# Patient Record
Sex: MALE | Race: WHITE | NOT HISPANIC OR LATINO | Employment: FULL TIME | URBAN - METROPOLITAN AREA
[De-identification: names, ages, dates, MRNs, and addresses within clinical notes are randomized per-mention and may not be internally consistent; named-entity substitution may affect disease eponyms.]

---

## 2019-09-19 ENCOUNTER — OFFICE VISIT (OUTPATIENT)
Dept: FAMILY MEDICINE CLINIC | Facility: CLINIC | Age: 44
End: 2019-09-19
Payer: COMMERCIAL

## 2019-09-19 VITALS
WEIGHT: 173.8 LBS | TEMPERATURE: 98.5 F | DIASTOLIC BLOOD PRESSURE: 92 MMHG | OXYGEN SATURATION: 100 % | HEIGHT: 69 IN | RESPIRATION RATE: 18 BRPM | BODY MASS INDEX: 25.74 KG/M2 | SYSTOLIC BLOOD PRESSURE: 150 MMHG | HEART RATE: 96 BPM

## 2019-09-19 DIAGNOSIS — E66.3 OVERWEIGHT (BMI 25.0-29.9): ICD-10-CM

## 2019-09-19 DIAGNOSIS — I10 HTN (HYPERTENSION), BENIGN: Primary | ICD-10-CM

## 2019-09-19 DIAGNOSIS — J06.9 BACTERIAL URI: ICD-10-CM

## 2019-09-19 DIAGNOSIS — B96.89 BACTERIAL URI: ICD-10-CM

## 2019-09-19 PROCEDURE — 99203 OFFICE O/P NEW LOW 30 MIN: CPT | Performed by: NURSE PRACTITIONER

## 2019-09-19 RX ORDER — FEXOFENADINE HCL 180 MG/1
TABLET ORAL
COMMUNITY
Start: 2017-03-30

## 2019-09-19 RX ORDER — BENZONATATE 200 MG/1
200 CAPSULE ORAL 3 TIMES DAILY PRN
COMMUNITY
Start: 2019-09-18 | End: 2019-09-25

## 2019-09-19 RX ORDER — LISINOPRIL 10 MG/1
10 TABLET ORAL DAILY
Qty: 90 TABLET | Refills: 0 | Status: SHIPPED | OUTPATIENT
Start: 2019-09-19 | End: 2019-12-11 | Stop reason: SDUPTHER

## 2019-09-19 RX ORDER — AZITHROMYCIN 250 MG/1
TABLET, FILM COATED ORAL
Qty: 6 TABLET | Refills: 0 | Status: SHIPPED | OUTPATIENT
Start: 2019-09-19 | End: 2019-09-24

## 2019-09-19 RX ORDER — EPINEPHRINE 0.3 MG/.3ML
0.3 INJECTION SUBCUTANEOUS
COMMUNITY
Start: 2019-04-22

## 2019-09-19 RX ORDER — MONTELUKAST SODIUM 10 MG/1
10 TABLET ORAL
COMMUNITY
Start: 2019-08-22 | End: 2019-10-31 | Stop reason: SDUPTHER

## 2019-09-19 NOTE — PATIENT INSTRUCTIONS
Start Lisinopril 10mg daily for 1 week- if blood pressure remains over 140/90, increase to 20mg daily x 1 week, if blood pressure remains over 140/90, increase to 30mg daily  Return for medcheck and blood pressure check in 3 weeks or sooner if needed  Discussed viral vs bacterial infection  Zithromax if symptoms worsen as discussed  Salt water nasal rinses for mucus  Call or return for problems/concerns/questions

## 2019-09-19 NOTE — PROGRESS NOTES
[facility]        NAME: Jun Weber is a 37 y o  male  : 1975    MRN: [id number]  DATE: 2019  TIME: 2:37 PM    Assessment and Plan   HTN (hypertension), benign [I10]  1  HTN (hypertension), benign  lisinopril (ZESTRIL) 10 mg tablet   2  Bacterial URI  azithromycin (ZITHROMAX) 250 mg tablet   3  Overweight (BMI 25 0-29  9)           Patient Instructions     Patient Instructions   Start Lisinopril 10mg daily for 1 week- if blood pressure remains over 140/90, increase to 20mg daily x 1 week, if blood pressure remains over 140/90, increase to 30mg daily  Return for medcheck and blood pressure check in 3 weeks or sooner if needed  Discussed viral vs bacterial infection  Zithromax if symptoms worsen as discussed  Salt water nasal rinses for mucus  Call or return for problems/concerns/questions          Chief Complaint     Chief Complaint   Patient presents with    Hypertension    URI         History of Present Illness       Here for BP recheck- high at urgent care yesterday- thinks his BP has been elevated "for a while"- as had headaches on/off "for a while"  Also c/o URI- got Tessalon perles yesterday- believes the symptoms are worsening      Review of Systems   Review of Systems   Constitutional: Negative for activity change, chills, fatigue and fever  HENT: Positive for congestion, ear pain, postnasal drip, rhinorrhea, sinus pressure and sore throat  Eyes: Negative for pain, discharge and redness  Respiratory: Positive for cough  Negative for wheezing  Cardiovascular: Negative for chest pain  Gastrointestinal: Negative for diarrhea, nausea and vomiting  Musculoskeletal: Negative for myalgias  Skin: Negative for rash  Neurological: Positive for headaches  Negative for dizziness           Current Medications       Current Outpatient Medications:     benzonatate (TESSALON) 200 MG capsule, Take 200 mg by mouth Three times daily as needed, Disp: , Rfl:   EPINEPHrine (EPIPEN) 0 3 mg/0 3 mL SOAJ, Inject 0 3 mg into a muscle, Disp: , Rfl:     fexofenadine (ALLEGRA) 180 MG tablet, TAKE 1 TABLET (180 MG TOTAL) BY MOUTH DAILY  , Disp: , Rfl:     montelukast (SINGULAIR) 10 mg tablet, Take 10 mg by mouth, Disp: , Rfl:     azithromycin (ZITHROMAX) 250 mg tablet, 2 tablets day 1, then 1 tablet days 2-5, Disp: 6 tablet, Rfl: 0    lisinopril (ZESTRIL) 10 mg tablet, Take 1 tablet (10 mg total) by mouth daily, Disp: 90 tablet, Rfl: 0    Current Allergies     Allergies as of 09/19/2019 - never reviewed   Allergen Reaction Noted    Pollen extract  06/22/2017            The following portions of the patient's history were reviewed and updated as appropriate: allergies, current medications, past family history, past medical history, past social history, past surgical history and problem list      Past Medical History:   Diagnosis Date    Allergic        Past Surgical History:   Procedure Laterality Date    NOSE SURGERY      WISDOM TOOTH EXTRACTION         Family History   Problem Relation Age of Onset    Depression Mother     Depression Father          Medications have been verified  Objective   /92   Pulse 96   Temp 98 5 °F (36 9 °C) (Tympanic)   Resp 18   Ht 5' 9" (1 753 m)   Wt 78 8 kg (173 lb 12 8 oz)   SpO2 100%   BMI 25 67 kg/m²        Physical Exam     Physical Exam   Constitutional: He is oriented to person, place, and time  He appears well-developed and well-nourished  No distress  HENT:   Head: Normocephalic and atraumatic  Right Ear: Tympanic membrane, external ear and ear canal normal    Left Ear: Tympanic membrane, external ear and ear canal normal    Nose: Nose normal    Mouth/Throat: Uvula is midline, oropharynx is clear and moist and mucous membranes are normal  No oropharyngeal exudate  Eyes: Pupils are equal, round, and reactive to light  Conjunctivae and EOM are normal  Right eye exhibits no discharge   Left eye exhibits no discharge  Neck: Normal range of motion  Neck supple  No tracheal deviation present  No thyromegaly present  Cardiovascular: Normal rate, regular rhythm and normal heart sounds  No murmur heard  Pulmonary/Chest: Effort normal and breath sounds normal  No respiratory distress  He has no wheezes  Musculoskeletal: Normal range of motion  Lymphadenopathy:     He has no cervical adenopathy  Neurological: He is alert and oriented to person, place, and time  Skin: Skin is warm and dry  He is not diaphoretic  Psychiatric: He has a normal mood and affect  His behavior is normal  Judgment and thought content normal    Nursing note and vitals reviewed  BMI Counseling: Body mass index is 25 67 kg/m²  The BMI is above normal  Nutrition recommendations include reducing portion sizes and moderation in carbohydrate intake      PHQ-9 Depression Screening    PHQ-9:    Frequency of the following problems over the past two weeks:       Little interest or pleasure in doing things:  0 - not at all  Feeling down, depressed, or hopeless:  0 - not at all  PHQ-2 Score:  0

## 2019-10-14 ENCOUNTER — OFFICE VISIT (OUTPATIENT)
Dept: FAMILY MEDICINE CLINIC | Facility: CLINIC | Age: 44
End: 2019-10-14
Payer: COMMERCIAL

## 2019-10-14 VITALS
SYSTOLIC BLOOD PRESSURE: 138 MMHG | BODY MASS INDEX: 25.77 KG/M2 | DIASTOLIC BLOOD PRESSURE: 98 MMHG | TEMPERATURE: 98.7 F | HEART RATE: 78 BPM | WEIGHT: 174 LBS | OXYGEN SATURATION: 98 % | RESPIRATION RATE: 19 BRPM | HEIGHT: 69 IN

## 2019-10-14 DIAGNOSIS — E78.1 HYPERTRIGLYCERIDEMIA: ICD-10-CM

## 2019-10-14 DIAGNOSIS — I10 HTN (HYPERTENSION), BENIGN: Primary | ICD-10-CM

## 2019-10-14 DIAGNOSIS — E66.3 OVERWEIGHT (BMI 25.0-29.9): ICD-10-CM

## 2019-10-14 DIAGNOSIS — Z23 NEEDS FLU SHOT: ICD-10-CM

## 2019-10-14 PROCEDURE — 3008F BODY MASS INDEX DOCD: CPT | Performed by: NURSE PRACTITIONER

## 2019-10-14 PROCEDURE — 90471 IMMUNIZATION ADMIN: CPT

## 2019-10-14 PROCEDURE — 99214 OFFICE O/P EST MOD 30 MIN: CPT | Performed by: NURSE PRACTITIONER

## 2019-10-14 PROCEDURE — 90686 IIV4 VACC NO PRSV 0.5 ML IM: CPT

## 2019-10-14 NOTE — PATIENT INSTRUCTIONS
Continue same dose of Lisinopril- if you see that the BP is getting higher each day- increase to 20mg daily  Labs as ordered before next appointment- fast for 8-12 hours  Medcheck/lab review in 6 months or sooner if needed

## 2019-10-14 NOTE — PROGRESS NOTES
[facility]        NAME: Francisco Morley is a 37 y o  male  : 1975    MRN: [id number]  DATE: 2019  TIME: 6:21 PM    Assessment and Plan   HTN (hypertension), benign [I10]  1  HTN (hypertension), benign  Comprehensive metabolic panel   2  Needs flu shot  influenza vaccine, [id number], quadrivalent, 0 5 mL, preservative-free, for adult and pediatric patients 6 mos+ (AFLURIA, FLUARIX, FLULAVAL, FLUZONE)   3  Hypertriglyceridemia  Lipid panel   4  Overweight (BMI 25 0-29  9)           Patient Instructions     Patient Instructions   Continue same dose of Lisinopril- if you see that the BP is getting higher each day- increase to 20mg daily  Labs as ordered before next appointment- fast for 8-12 hours  Medcheck/lab review in 6 months or sooner if needed          Chief Complaint     Chief Complaint   Patient presents with    Follow-up     blood pressure          History of Present Illness       BP recheck- has a log from at home on his phone- averages about 110/70  Denies side effects  Reviewed labs from 1 year ago- high triglycerides      Review of Systems   Review of Systems   Constitutional: Negative for activity change, diaphoresis, fatigue and fever  HENT: Negative for congestion, facial swelling, hearing loss, rhinorrhea, sinus pressure, sinus pain, sneezing, sore throat and voice change  Eyes: Negative for discharge and visual disturbance  Respiratory: Negative for cough, choking, chest tightness, shortness of breath, wheezing and stridor  Cardiovascular: Negative for chest pain, palpitations and leg swelling  Gastrointestinal: Negative for abdominal distention, abdominal pain, constipation, diarrhea, nausea and vomiting  Endocrine: Negative for polydipsia, polyphagia and polyuria  Genitourinary: Negative for difficulty urinating, dysuria, frequency and urgency     Musculoskeletal: Negative for arthralgias, back pain, gait problem, joint swelling, myalgias, neck pain and neck stiffness  Skin: Negative for color change, rash and wound  Neurological: Negative for dizziness, syncope, speech difficulty, weakness, light-headedness and headaches  Hematological: Negative for adenopathy  Does not bruise/bleed easily  Psychiatric/Behavioral: Negative for agitation, behavioral problems, confusion, hallucinations, sleep disturbance and suicidal ideas  The patient is not nervous/anxious  Current Medications       Current Outpatient Medications:     EPINEPHrine (EPIPEN) 0 3 mg/0 3 mL SOAJ, Inject 0 3 mg into a muscle, Disp: , Rfl:     fexofenadine (ALLEGRA) 180 MG tablet, TAKE 1 TABLET (180 MG TOTAL) BY MOUTH DAILY  , Disp: , Rfl:     lisinopril (ZESTRIL) 10 mg tablet, Take 1 tablet (10 mg total) by mouth daily, Disp: 90 tablet, Rfl: 0    montelukast (SINGULAIR) 10 mg tablet, Take 10 mg by mouth, Disp: , Rfl:     Current Allergies     Allergies as of 10/14/2019 - Reviewed 10/14/2019   Allergen Reaction Noted    Pollen extract  06/22/2017            The following portions of the patient's history were reviewed and updated as appropriate: allergies, current medications, past family history, past medical history, past social history, past surgical history and problem list      Past Medical History:   Diagnosis Date    Allergic        Past Surgical History:   Procedure Laterality Date    NOSE SURGERY      WISDOM TOOTH EXTRACTION         Family History   Problem Relation Age of Onset    Depression Mother     Depression Father          Medications have been verified  Objective   /98   Pulse 78   Temp 98 7 °F (37 1 °C)   Resp 19   Ht 5' 9" (1 753 m)   Wt 78 9 kg (174 lb)   SpO2 98%   BMI 25 70 kg/m²        Physical Exam     Physical Exam   Constitutional: He is oriented to person, place, and time  He appears well-developed and well-nourished  No distress  Cardiovascular: Normal rate, regular rhythm and normal heart sounds     No murmur heard   Pulmonary/Chest: Effort normal and breath sounds normal  No respiratory distress  He has no wheezes  Musculoskeletal: Normal range of motion  Neurological: He is alert and oriented to person, place, and time  Skin: Skin is warm and dry  He is not diaphoretic  Psychiatric: He has a normal mood and affect  His behavior is normal  Judgment and thought content normal    Nursing note and vitals reviewed  BMI Counseling: Body mass index is 25 7 kg/m²  The BMI is above normal  Nutrition recommendations include consuming healthier snacks, decreasing soda and/or juice intake, moderation in carbohydrate intake, increasing intake of lean protein and reducing intake of saturated fat and trans fat

## 2019-10-31 DIAGNOSIS — Z76.0 MEDICATION REFILL: Primary | ICD-10-CM

## 2019-10-31 RX ORDER — MONTELUKAST SODIUM 10 MG/1
10 TABLET ORAL
Qty: 90 TABLET | Refills: 1 | Status: SHIPPED | OUTPATIENT
Start: 2019-10-31 | End: 2020-09-02

## 2019-10-31 NOTE — TELEPHONE ENCOUNTER
Patient called needing refill on his Montelukast 10mg 1 tab daily by mouth at bedtime#90 day supply with 1 refill sent to [facility]

## 2019-12-11 DIAGNOSIS — I10 HTN (HYPERTENSION), BENIGN: ICD-10-CM

## 2019-12-12 RX ORDER — LISINOPRIL 10 MG/1
TABLET ORAL
Qty: 90 TABLET | Refills: 0 | Status: SHIPPED | OUTPATIENT
Start: 2019-12-12 | End: 2020-03-19 | Stop reason: DRUGHIGH

## 2019-12-30 DIAGNOSIS — K58.0 IRRITABLE BOWEL SYNDROME WITH DIARRHEA: Primary | ICD-10-CM

## 2019-12-30 RX ORDER — RIFAXIMIN 200 MG/1
TABLET ORAL
Qty: 42 TABLET | Refills: 2 | Status: SHIPPED | OUTPATIENT
Start: 2019-12-30 | End: 2020-01-13

## 2020-02-15 ENCOUNTER — APPOINTMENT (OUTPATIENT)
Dept: LAB | Facility: CLINIC | Age: 45
End: 2020-02-15
Payer: COMMERCIAL

## 2020-02-15 DIAGNOSIS — E78.1 HYPERTRIGLYCERIDEMIA: ICD-10-CM

## 2020-02-15 DIAGNOSIS — I10 HTN (HYPERTENSION), BENIGN: ICD-10-CM

## 2020-02-15 LAB
ALBUMIN SERPL BCP-MCNC: 3.8 G/DL (ref 3.5–5)
ALP SERPL-CCNC: 51 U/L (ref 46–116)
ALT SERPL W P-5'-P-CCNC: 37 U/L (ref 12–78)
ANION GAP SERPL CALCULATED.3IONS-SCNC: 4 MMOL/L (ref 4–13)
AST SERPL W P-5'-P-CCNC: 20 U/L (ref 5–45)
BILIRUB SERPL-MCNC: 0.47 MG/DL (ref 0.2–1)
BUN SERPL-MCNC: 18 MG/DL (ref 5–25)
CALCIUM SERPL-MCNC: 9.1 MG/DL (ref 8.3–10.1)
CHLORIDE SERPL-SCNC: 107 MMOL/L (ref 100–108)
CHOLEST SERPL-MCNC: 191 MG/DL (ref 50–200)
CO2 SERPL-SCNC: 29 MMOL/L (ref 21–32)
CREAT SERPL-MCNC: 0.92 MG/DL (ref 0.6–1.3)
GFR SERPL CREATININE-BSD FRML MDRD: 99 ML/MIN/1.73SQ M
GLUCOSE P FAST SERPL-MCNC: 97 MG/DL (ref 65–99)
HDLC SERPL-MCNC: 36 MG/DL
LDLC SERPL CALC-MCNC: 118 MG/DL (ref 0–100)
NONHDLC SERPL-MCNC: 155 MG/DL
POTASSIUM SERPL-SCNC: 4.3 MMOL/L (ref 3.5–5.3)
PROT SERPL-MCNC: 7 G/DL (ref 6.4–8.2)
SODIUM SERPL-SCNC: 140 MMOL/L (ref 136–145)
TRIGL SERPL-MCNC: 184 MG/DL

## 2020-02-15 PROCEDURE — 80061 LIPID PANEL: CPT

## 2020-02-15 PROCEDURE — 36415 COLL VENOUS BLD VENIPUNCTURE: CPT

## 2020-02-15 PROCEDURE — 80053 COMPREHEN METABOLIC PANEL: CPT

## 2020-03-19 ENCOUNTER — DOCUMENTATION (OUTPATIENT)
Dept: FAMILY MEDICINE CLINIC | Facility: CLINIC | Age: 45
End: 2020-03-19

## 2020-03-19 DIAGNOSIS — I10 ESSENTIAL HYPERTENSION: Primary | ICD-10-CM

## 2020-03-19 DIAGNOSIS — J06.9 URI, ACUTE: ICD-10-CM

## 2020-03-19 RX ORDER — PREDNISONE 20 MG/1
20 TABLET ORAL 2 TIMES DAILY WITH MEALS
Qty: 10 TABLET | Refills: 0 | Status: SHIPPED | OUTPATIENT
Start: 2020-03-19 | End: 2020-03-24

## 2020-03-19 RX ORDER — LISINOPRIL 20 MG/1
20 TABLET ORAL DAILY
Qty: 90 TABLET | Refills: 1 | Status: SHIPPED | OUTPATIENT
Start: 2020-03-19 | End: 2020-08-11 | Stop reason: SDUPTHER

## 2020-04-06 ENCOUNTER — TELEMEDICINE (OUTPATIENT)
Dept: FAMILY MEDICINE CLINIC | Facility: CLINIC | Age: 45
End: 2020-04-06
Payer: COMMERCIAL

## 2020-04-06 VITALS — SYSTOLIC BLOOD PRESSURE: 169 MMHG | RESPIRATION RATE: 16 BRPM | DIASTOLIC BLOOD PRESSURE: 109 MMHG

## 2020-04-06 DIAGNOSIS — E78.1 HYPERTRIGLYCERIDEMIA: ICD-10-CM

## 2020-04-06 DIAGNOSIS — I10 HTN (HYPERTENSION), BENIGN: Primary | ICD-10-CM

## 2020-04-06 PROCEDURE — 99213 OFFICE O/P EST LOW 20 MIN: CPT | Performed by: NURSE PRACTITIONER

## 2020-04-15 ENCOUNTER — DOCUMENTATION (OUTPATIENT)
Dept: FAMILY MEDICINE CLINIC | Facility: CLINIC | Age: 45
End: 2020-04-15

## 2020-04-15 DIAGNOSIS — I10 ESSENTIAL HYPERTENSION: Primary | ICD-10-CM

## 2020-04-15 RX ORDER — LISINOPRIL 30 MG/1
30 TABLET ORAL DAILY
Qty: 90 TABLET | Refills: 3
Start: 2020-04-15 | End: 2020-07-06

## 2020-04-15 RX ORDER — LISINOPRIL 10 MG/1
10 TABLET ORAL DAILY
Qty: 90 TABLET | Refills: 0 | Status: SHIPPED | OUTPATIENT
Start: 2020-04-15 | End: 2020-07-06

## 2020-07-06 ENCOUNTER — OFFICE VISIT (OUTPATIENT)
Dept: FAMILY MEDICINE CLINIC | Facility: CLINIC | Age: 45
End: 2020-07-06
Payer: COMMERCIAL

## 2020-07-06 VITALS
RESPIRATION RATE: 18 BRPM | OXYGEN SATURATION: 98 % | WEIGHT: 169 LBS | DIASTOLIC BLOOD PRESSURE: 74 MMHG | BODY MASS INDEX: 25.03 KG/M2 | HEART RATE: 78 BPM | HEIGHT: 69 IN | TEMPERATURE: 97.3 F | SYSTOLIC BLOOD PRESSURE: 122 MMHG

## 2020-07-06 DIAGNOSIS — I10 ESSENTIAL HYPERTENSION: ICD-10-CM

## 2020-07-06 DIAGNOSIS — Z00.00 ANNUAL PHYSICAL EXAM: Primary | ICD-10-CM

## 2020-07-06 PROCEDURE — 3008F BODY MASS INDEX DOCD: CPT | Performed by: NURSE PRACTITIONER

## 2020-07-06 PROCEDURE — 3078F DIAST BP <80 MM HG: CPT | Performed by: NURSE PRACTITIONER

## 2020-07-06 PROCEDURE — 3074F SYST BP LT 130 MM HG: CPT | Performed by: NURSE PRACTITIONER

## 2020-07-06 PROCEDURE — 99396 PREV VISIT EST AGE 40-64: CPT | Performed by: NURSE PRACTITIONER

## 2020-07-06 RX ORDER — LISINOPRIL 10 MG/1
TABLET ORAL
Qty: 90 TABLET | Refills: 0 | OUTPATIENT
Start: 2020-07-06

## 2020-07-06 NOTE — TELEPHONE ENCOUNTER
Patient either needs a follow up appointment or to give us some of his home blood pressure results  Also, can you ask him if he's taking 10mg or 20mg before I authorize this script? I believe he increased the dose  Thanks!

## 2020-07-06 NOTE — PROGRESS NOTES
HPI:  Herlinda Isaac is a 53 y o  male here for his yearly health maintenance exam    Patient Active Problem List   Diagnosis    Overweight (BMI 25 0-29  9)    HTN (hypertension), benign    Hypertriglyceridemia     Past Medical History:   Diagnosis Date    Allergic         1  Advanced Directive: na     2  Durable Power of  for Healthcare: na     3  Social History:               Marital History: - Karma              Work Status:               Drug and alcohol History: none              Alcohol Use: monthly     4  General Health: good health              Regular Dental Visits:yes              Vision problems:glasses              Hearing Loss:yes, seen by specialist in the past              Immunizations up to date: na                 Lifestyle:                           Healthy Diet:regular- less fast food                          Tobacco Use:none                          Regular exercise:walks every day                                  PHQ-9 Depression Screening    PHQ-9:    Frequency of the following problems over the past two weeks:       Little interest or pleasure in doing things:  0 - not at all  Feeling down, depressed, or hopeless:  0 - not at all  PHQ-2 Score:  0           Current Outpatient Medications   Medication Sig Dispense Refill    EPINEPHrine (EPIPEN) 0 3 mg/0 3 mL SOAJ Inject 0 3 mg into a muscle      fexofenadine (ALLEGRA) 180 MG tablet TAKE 1 TABLET (180 MG TOTAL) BY MOUTH DAILY   lisinopril (ZESTRIL) 20 mg tablet Take 1 tablet (20 mg total) by mouth daily (Patient taking differently: Take 20 mg by mouth 2 (two) times a day ) 90 tablet 1    montelukast (SINGULAIR) 10 mg tablet Take 1 tablet (10 mg total) by mouth daily at bedtime 90 tablet 1     No current facility-administered medications for this visit        Allergies   Allergen Reactions    Pollen Extract      Immunization History   Administered Date(s) Administered    H1N1, All Formulations 01/30/2010    INFLUENZA 10/01/2000, 10/13/2010, 09/29/2016, 09/21/2017    Influenza, injectable, quadrivalent, preservative free 0 5 mL 10/14/2019    Td (adult), Unspecified 01/01/2001    Tdap 03/21/2011       Patient Care Team:  Sia Tyson as PCP - General (Family Medicine)    Review of Systems   Constitutional: Negative for activity change, diaphoresis, fatigue and fever  HENT: Negative for congestion, facial swelling, hearing loss, rhinorrhea, sinus pressure, sinus pain, sneezing, sore throat and voice change  Eyes: Negative for discharge and visual disturbance  Respiratory: Negative for cough, choking, chest tightness, shortness of breath, wheezing and stridor  Cardiovascular: Negative for chest pain, palpitations and leg swelling  Gastrointestinal: Negative for abdominal distention, abdominal pain, constipation, diarrhea, nausea and vomiting  Endocrine: Negative for polydipsia, polyphagia and polyuria  Genitourinary: Negative for difficulty urinating, dysuria, frequency and urgency  Musculoskeletal: Negative for arthralgias, back pain, gait problem, joint swelling, myalgias, neck pain and neck stiffness  Skin: Negative for color change, rash and wound  Neurological: Negative for dizziness, syncope, speech difficulty, weakness, light-headedness and headaches  Hematological: Negative for adenopathy  Does not bruise/bleed easily  Psychiatric/Behavioral: Negative for agitation, behavioral problems, confusion, hallucinations, sleep disturbance and suicidal ideas  The patient is not nervous/anxious  Physical Exam :  Physical Exam   Constitutional: He is oriented to person, place, and time  He appears well-developed and well-nourished  No distress  Neck: Normal range of motion  Cardiovascular: Normal rate, regular rhythm and normal heart sounds  No murmur heard  Pulmonary/Chest: Effort normal and breath sounds normal  No respiratory distress  He has no wheezes  Musculoskeletal: Normal range of motion  Neurological: He is alert and oriented to person, place, and time  Skin: Skin is warm and dry  He is not diaphoretic  Psychiatric: He has a normal mood and affect  His speech is normal and behavior is normal  Judgment and thought content normal  Cognition and memory are normal    Nursing note and vitals reviewed  Reviewed Updated [facility]its:   Last Health Maintenance visit information was reviewed, patient interviewed , no change since last HM visit no  Last HM visit information was reviewed, patient interviewed and updates made to the record today no    Assessment and Plan:  1   Annual physical exam         Health Maintenance Due   Topic Date Due    HIV Screening  11/30/1988    Annual Physical  11/30/1991    Influenza Vaccine  07/01/2020

## 2020-07-06 NOTE — PATIENT INSTRUCTIONS
Continue Lisinopril 20mg twice a day- call for refill when needed  Will do 1 year labs (will order at next visit)  Return in 6 months or sooner if needed

## 2020-08-11 DIAGNOSIS — I10 ESSENTIAL HYPERTENSION: ICD-10-CM

## 2020-08-11 NOTE — TELEPHONE ENCOUNTER
Pt called requesting refill of Lisinopril 20mg BID to be sent to [facility]  Please advise

## 2020-08-12 RX ORDER — LISINOPRIL 20 MG/1
20 TABLET ORAL 2 TIMES DAILY
Qty: 180 TABLET | Refills: 1 | Status: SHIPPED | OUTPATIENT
Start: 2020-08-12 | End: 2021-02-16

## 2020-09-02 DIAGNOSIS — Z76.0 MEDICATION REFILL: ICD-10-CM

## 2020-09-02 DIAGNOSIS — I10 ESSENTIAL HYPERTENSION: ICD-10-CM

## 2020-09-02 RX ORDER — LISINOPRIL 20 MG/1
TABLET ORAL
Qty: 90 TABLET | Refills: 1 | OUTPATIENT
Start: 2020-09-02

## 2020-09-02 RX ORDER — MONTELUKAST SODIUM 10 MG/1
TABLET ORAL
Qty: 90 TABLET | Refills: 1 | Status: SHIPPED | OUTPATIENT
Start: 2020-09-02 | End: 2021-02-16

## 2021-01-11 ENCOUNTER — OFFICE VISIT (OUTPATIENT)
Dept: FAMILY MEDICINE CLINIC | Facility: CLINIC | Age: 46
End: 2021-01-11
Payer: COMMERCIAL

## 2021-01-11 VITALS
OXYGEN SATURATION: 100 % | HEIGHT: 69 IN | SYSTOLIC BLOOD PRESSURE: 130 MMHG | RESPIRATION RATE: 20 BRPM | DIASTOLIC BLOOD PRESSURE: 88 MMHG | HEART RATE: 77 BPM | TEMPERATURE: 98.9 F | WEIGHT: 173.2 LBS | BODY MASS INDEX: 25.65 KG/M2

## 2021-01-11 DIAGNOSIS — E66.3 OVERWEIGHT WITH BODY MASS INDEX (BMI) OF 25 TO 25.9 IN ADULT: ICD-10-CM

## 2021-01-11 DIAGNOSIS — Z13.1 SCREENING FOR DIABETES MELLITUS: ICD-10-CM

## 2021-01-11 DIAGNOSIS — I10 ESSENTIAL HYPERTENSION: Primary | ICD-10-CM

## 2021-01-11 DIAGNOSIS — E78.1 HYPERTRIGLYCERIDEMIA: ICD-10-CM

## 2021-01-11 PROCEDURE — 3075F SYST BP GE 130 - 139MM HG: CPT | Performed by: NURSE PRACTITIONER

## 2021-01-11 PROCEDURE — 3008F BODY MASS INDEX DOCD: CPT | Performed by: NURSE PRACTITIONER

## 2021-01-11 PROCEDURE — 1036F TOBACCO NON-USER: CPT | Performed by: NURSE PRACTITIONER

## 2021-01-11 PROCEDURE — 3079F DIAST BP 80-89 MM HG: CPT | Performed by: NURSE PRACTITIONER

## 2021-01-11 PROCEDURE — 99213 OFFICE O/P EST LOW 20 MIN: CPT | Performed by: NURSE PRACTITIONER

## 2021-01-11 NOTE — PROGRESS NOTES
[facility]        NAME: Enrike Martínez is a 50 y o  male  : 1975    MRN: [id number]  DATE: 2021  TIME: 6:22 PM    Assessment and Plan   Essential hypertension [I10]  1  Essential hypertension  Comprehensive metabolic panel   2  Hypertriglyceridemia  Lipid panel   3  Screening for diabetes mellitus  HEMOGLOBIN A1C W/ EAG ESTIMATION   4  Overweight with body mass index (BMI) of 25 to 25 9 in adult           Patient Instructions     Patient Instructions   Get labs as ordered  Continue Lisinopril 20mg twice daily as directed  medcheck every 6 months or call sooner for problems/concerns          Chief Complaint     Chief Complaint   Patient presents with    Follow-up         History of Present Illness       Here for 6 month medcheck  Is due for labs in February  No complaints or concerns  Is taking his Lisinopril 20mg twice daily      Review of Systems   Review of Systems   Constitutional: Negative for activity change, diaphoresis, fatigue and fever  HENT: Negative for congestion, facial swelling, hearing loss, rhinorrhea, sinus pressure, sinus pain, sneezing, sore throat and voice change  Eyes: Negative for discharge and visual disturbance  Respiratory: Negative for cough, choking, chest tightness, shortness of breath, wheezing and stridor  Cardiovascular: Negative for chest pain, palpitations and leg swelling  Gastrointestinal: Negative for abdominal distention, abdominal pain, constipation, diarrhea, nausea and vomiting  Endocrine: Negative for polydipsia, polyphagia and polyuria  Genitourinary: Negative for difficulty urinating, dysuria, frequency and urgency  Musculoskeletal: Negative for arthralgias, back pain, gait problem, joint swelling, myalgias, neck pain and neck stiffness  Skin: Negative for color change, rash and wound  Neurological: Negative for dizziness, syncope, speech difficulty, weakness, light-headedness and headaches  Hematological: Negative for adenopathy  Does not bruise/bleed easily  Psychiatric/Behavioral: Negative for agitation, behavioral problems, confusion, hallucinations, sleep disturbance and suicidal ideas  The patient is not nervous/anxious  Current Medications       Current Outpatient Medications:     EPINEPHrine (EPIPEN) 0 3 mg/0 3 mL SOAJ, Inject 0 3 mg into a muscle, Disp: , Rfl:     fexofenadine (ALLEGRA) 180 MG tablet, TAKE 1 TABLET (180 MG TOTAL) BY MOUTH DAILY  , Disp: , Rfl:     lisinopril (ZESTRIL) 20 mg tablet, Take 1 tablet (20 mg total) by mouth 2 (two) times a day, Disp: 180 tablet, Rfl: 1    montelukast (SINGULAIR) 10 mg tablet, TAKE 1 TABLET BY MOUTH DAILY AT BEDTIME, Disp: 90 tablet, Rfl: 1    Current Allergies     Allergies as of 01/11/2021 - Reviewed 01/11/2021   Allergen Reaction Noted    Pollen extract  06/22/2017            The following portions of the patient's history were reviewed and updated as appropriate: allergies, current medications, past family history, past medical history, past social history, past surgical history and problem list      Past Medical History:   Diagnosis Date    Allergic     Hypertension        Past Surgical History:   Procedure Laterality Date    NOSE SURGERY      WISDOM TOOTH EXTRACTION         Family History   Problem Relation Age of Onset    Depression Mother     Depression Father          Medications have been verified  Objective   /88   Pulse 77   Temp 98 9 °F (37 2 °C) (Tympanic)   Resp 20   Ht 5' 9" (1 753 m)   Wt 78 6 kg (173 lb 3 2 oz)   SpO2 100%   BMI 25 58 kg/m²        Physical Exam     Physical Exam  Vitals signs and nursing note reviewed  Constitutional:       General: He is not in acute distress  Appearance: He is well-developed  He is not diaphoretic  Neck:      Musculoskeletal: Normal range of motion  Trachea: No tracheal deviation     Cardiovascular:      Rate and Rhythm: Normal rate and regular rhythm  Heart sounds: Normal heart sounds  No murmur  Pulmonary:      Effort: Pulmonary effort is normal  No respiratory distress  Breath sounds: Normal breath sounds  No wheezing  Musculoskeletal: Normal range of motion  General: No tenderness or deformity  Skin:     General: Skin is warm and dry  Neurological:      Mental Status: He is alert and oriented to person, place, and time  Psychiatric:         Speech: Speech normal          Behavior: Behavior normal          Thought Content: Thought content normal          Judgment: Judgment normal          BMI Counseling: Body mass index is 25 58 kg/m²  The BMI is above normal  Nutrition recommendations include decreasing portion sizes, encouraging healthy choices of fruits and vegetables, decreasing fast food intake, consuming healthier snacks, limiting drinks that contain sugar, moderation in carbohydrate intake and increasing intake of lean protein  Exercise recommendations include exercising 3-5 times per week

## 2021-01-11 NOTE — PATIENT INSTRUCTIONS
Get labs as ordered  Continue Lisinopril 20mg twice daily as directed  medcheck every 6 months or call sooner for problems/concerns

## 2021-02-16 DIAGNOSIS — I10 ESSENTIAL HYPERTENSION: ICD-10-CM

## 2021-02-16 DIAGNOSIS — Z76.0 MEDICATION REFILL: ICD-10-CM

## 2021-02-16 RX ORDER — MONTELUKAST SODIUM 10 MG/1
TABLET ORAL
Qty: 90 TABLET | Refills: 1 | Status: SHIPPED | OUTPATIENT
Start: 2021-02-16 | End: 2021-08-12

## 2021-02-16 RX ORDER — LISINOPRIL 20 MG/1
TABLET ORAL
Qty: 180 TABLET | Refills: 1 | Status: SHIPPED | OUTPATIENT
Start: 2021-02-16 | End: 2021-07-12

## 2021-04-01 ENCOUNTER — APPOINTMENT (OUTPATIENT)
Dept: LAB | Facility: CLINIC | Age: 46
End: 2021-04-01
Payer: COMMERCIAL

## 2021-04-01 DIAGNOSIS — I10 ESSENTIAL HYPERTENSION: ICD-10-CM

## 2021-04-01 DIAGNOSIS — Z13.1 SCREENING FOR DIABETES MELLITUS: ICD-10-CM

## 2021-04-01 DIAGNOSIS — E78.1 HYPERTRIGLYCERIDEMIA: ICD-10-CM

## 2021-04-01 LAB
ALBUMIN SERPL BCP-MCNC: 4.1 G/DL (ref 3.5–5)
ALP SERPL-CCNC: 51 U/L (ref 46–116)
ALT SERPL W P-5'-P-CCNC: 40 U/L (ref 12–78)
ANION GAP SERPL CALCULATED.3IONS-SCNC: 1 MMOL/L (ref 4–13)
AST SERPL W P-5'-P-CCNC: 23 U/L (ref 5–45)
BILIRUB SERPL-MCNC: 0.63 MG/DL (ref 0.2–1)
BUN SERPL-MCNC: 12 MG/DL (ref 5–25)
CALCIUM SERPL-MCNC: 9.3 MG/DL (ref 8.3–10.1)
CHLORIDE SERPL-SCNC: 105 MMOL/L (ref 100–108)
CHOLEST SERPL-MCNC: 200 MG/DL (ref 50–200)
CO2 SERPL-SCNC: 32 MMOL/L (ref 21–32)
CREAT SERPL-MCNC: 0.91 MG/DL (ref 0.6–1.3)
EST. AVERAGE GLUCOSE BLD GHB EST-MCNC: 108 MG/DL
GFR SERPL CREATININE-BSD FRML MDRD: 100 ML/MIN/1.73SQ M
GLUCOSE P FAST SERPL-MCNC: 93 MG/DL (ref 65–99)
HBA1C MFR BLD: 5.4 %
HDLC SERPL-MCNC: 41 MG/DL
LDLC SERPL CALC-MCNC: 132 MG/DL (ref 0–100)
NONHDLC SERPL-MCNC: 159 MG/DL
POTASSIUM SERPL-SCNC: 4.4 MMOL/L (ref 3.5–5.3)
PROT SERPL-MCNC: 7.4 G/DL (ref 6.4–8.2)
SODIUM SERPL-SCNC: 138 MMOL/L (ref 136–145)
TRIGL SERPL-MCNC: 136 MG/DL

## 2021-04-01 PROCEDURE — 80061 LIPID PANEL: CPT

## 2021-04-01 PROCEDURE — 80053 COMPREHEN METABOLIC PANEL: CPT

## 2021-04-01 PROCEDURE — 36415 COLL VENOUS BLD VENIPUNCTURE: CPT

## 2021-04-01 PROCEDURE — 83036 HEMOGLOBIN GLYCOSYLATED A1C: CPT

## 2021-04-05 DIAGNOSIS — Z23 ENCOUNTER FOR IMMUNIZATION: ICD-10-CM

## 2021-05-12 ENCOUNTER — OFFICE VISIT (OUTPATIENT)
Dept: URGENT CARE | Facility: CLINIC | Age: 46
End: 2021-05-12
Payer: COMMERCIAL

## 2021-05-12 VITALS
SYSTOLIC BLOOD PRESSURE: 134 MMHG | WEIGHT: 170 LBS | RESPIRATION RATE: 16 BRPM | BODY MASS INDEX: 25.18 KG/M2 | TEMPERATURE: 97.9 F | HEART RATE: 75 BPM | DIASTOLIC BLOOD PRESSURE: 86 MMHG | OXYGEN SATURATION: 98 % | HEIGHT: 69 IN

## 2021-05-12 DIAGNOSIS — J01.10 ACUTE NON-RECURRENT FRONTAL SINUSITIS: Primary | ICD-10-CM

## 2021-05-12 PROCEDURE — 99213 OFFICE O/P EST LOW 20 MIN: CPT | Performed by: PHYSICIAN ASSISTANT

## 2021-05-12 RX ORDER — AZITHROMYCIN 250 MG/1
TABLET, FILM COATED ORAL
Qty: 6 TABLET | Refills: 0 | Status: SHIPPED | OUTPATIENT
Start: 2021-05-12 | End: 2021-05-16

## 2021-05-12 RX ORDER — FLUTICASONE PROPIONATE 50 MCG
1 SPRAY, SUSPENSION (ML) NASAL 2 TIMES DAILY PRN
Qty: 1 BOTTLE | Refills: 0 | Status: SHIPPED | OUTPATIENT
Start: 2021-05-12 | End: 2022-07-15

## 2021-05-12 NOTE — PROGRESS NOTES
[facility]        NAME: Tana Turner is a 50 y o  male  : 1975    MRN: [id number]  DATE: May 12, 2021  TIME: 7:49 PM    Assessment and Plan   Acute non-recurrent frontal sinusitis [J01 10]  1  Acute non-recurrent frontal sinusitis  fluticasone (FLONASE) 50 mcg/act nasal spray    azithromycin (ZITHROMAX) 250 mg tablet         Patient Instructions     Patient Instructions   Hydration and rest  Tylenol and motrin  Sudafed and mucinex OTC  Start flonase and azithromycin  PCP follow up  Return to clinic with new or worsening symptoms  **Portions of the record may have been created with voice recognition software  Occasional wrong word or "sound a like" substitutions may have occurred due to the inherent limitations of voice recognition software  Read the chart carefully and recognize, using context, where substitutions have occurred  **     Chief Complaint     Chief Complaint   Patient presents with    Facial Pain     L sided sinus pain/pressure starting yesterday  Also c/o sore throat and L ear popping  History of Present Illness       24-year-old male presents clinic with complaints of right-sided sinus pain x1 day  States for the past week or so he has had allergy symptoms on and off, runny nose and sore throat  He denies any fever, chills, shortness of breath or chest pain, cough, loss of taste or smell  No known sick contacts  No recent travel      Review of Systems     Review of Systems   Constitutional: Negative for chills, fatigue and fever  HENT: Positive for congestion, ear pain, sinus pressure, sinus pain and sore throat  Negative for postnasal drip and rhinorrhea  Respiratory: Negative for cough and shortness of breath  Cardiovascular: Negative for chest pain  Gastrointestinal: Negative for diarrhea and vomiting  Musculoskeletal: Negative for myalgias  Skin: Negative for rash  Neurological: Positive for headaches           Current Medications Current Outpatient Medications:     fexofenadine (ALLEGRA) 180 MG tablet, TAKE 1 TABLET (180 MG TOTAL) BY MOUTH DAILY  , Disp: , Rfl:     lisinopril (ZESTRIL) 20 mg tablet, TAKE 1 TABLET BY MOUTH TWICE A DAY, Disp: 180 tablet, Rfl: 1    montelukast (SINGULAIR) 10 mg tablet, TAKE 1 TABLET BY MOUTH DAILY AT BEDTIME, Disp: 90 tablet, Rfl: 1    azithromycin (ZITHROMAX) 250 mg tablet, Take 2 tablets today then 1 tablet daily x 4 days, Disp: 6 tablet, Rfl: 0    EPINEPHrine (EPIPEN) 0 3 mg/0 3 mL SOAJ, Inject 0 3 mg into a muscle, Disp: , Rfl:     fluticasone (FLONASE) 50 mcg/act nasal spray, 1 spray into each nostril 2 (two) times a day as needed for rhinitis, Disp: 1 Bottle, Rfl: 0    Current Allergies     Allergies as of 05/12/2021 - Reviewed 05/12/2021   Allergen Reaction Noted    Pollen extract  06/22/2017            The following portions of the patient's history were reviewed and updated as appropriate: allergies, current medications, past family history, past medical history, past social history, past surgical history and problem list      Past Medical History:   Diagnosis Date    Allergic     Hypertension        Past Surgical History:   Procedure Laterality Date    NOSE SURGERY      WISDOM TOOTH EXTRACTION         Family History   Problem Relation Age of Onset    Depression Mother     Depression Father          Medications have been verified  Objective     /86   Pulse 75   Temp 97 9 °F (36 6 °C) (Temporal)   Resp 16   Ht 5' 9" (1 753 m)   Wt 77 1 kg (170 lb)   SpO2 98%   BMI 25 10 kg/m²        Physical Exam     Physical Exam  Vitals signs and nursing note reviewed  Constitutional:       General: He is not in acute distress  Appearance: Normal appearance  HENT:      Head: Normocephalic and atraumatic  Right Ear: There is impacted cerumen  Left Ear: Tympanic membrane and ear canal normal       Nose: Congestion present  No rhinorrhea        Right Sinus: Frontal sinus tenderness present  Left Sinus: Frontal sinus tenderness present  Mouth/Throat:      Pharynx: Oropharynx is clear  Uvula midline  No pharyngeal swelling or posterior oropharyngeal erythema  Cardiovascular:      Rate and Rhythm: Normal rate and regular rhythm  Pulmonary:      Effort: Pulmonary effort is normal       Breath sounds: Normal breath sounds  Skin:     General: Skin is warm and dry  Findings: No rash  Neurological:      Mental Status: He is alert

## 2021-05-12 NOTE — PATIENT INSTRUCTIONS
Hydration and rest  Tylenol and motrin  Sudafed and mucinex OTC  Start flonase and azithromycin  PCP follow up  Return to clinic with new or worsening symptoms

## 2021-07-12 ENCOUNTER — OFFICE VISIT (OUTPATIENT)
Dept: FAMILY MEDICINE CLINIC | Facility: CLINIC | Age: 46
End: 2021-07-12
Payer: COMMERCIAL

## 2021-07-12 VITALS
TEMPERATURE: 98 F | OXYGEN SATURATION: 98 % | RESPIRATION RATE: 20 BRPM | BODY MASS INDEX: 24.73 KG/M2 | HEART RATE: 71 BPM | SYSTOLIC BLOOD PRESSURE: 128 MMHG | WEIGHT: 167 LBS | DIASTOLIC BLOOD PRESSURE: 82 MMHG | HEIGHT: 69 IN

## 2021-07-12 DIAGNOSIS — I10 ESSENTIAL HYPERTENSION: Primary | ICD-10-CM

## 2021-07-12 DIAGNOSIS — Z00.00 ANNUAL PHYSICAL EXAM: ICD-10-CM

## 2021-07-12 PROCEDURE — 3079F DIAST BP 80-89 MM HG: CPT | Performed by: NURSE PRACTITIONER

## 2021-07-12 PROCEDURE — 3008F BODY MASS INDEX DOCD: CPT | Performed by: NURSE PRACTITIONER

## 2021-07-12 PROCEDURE — 99213 OFFICE O/P EST LOW 20 MIN: CPT | Performed by: NURSE PRACTITIONER

## 2021-07-12 PROCEDURE — 3725F SCREEN DEPRESSION PERFORMED: CPT | Performed by: NURSE PRACTITIONER

## 2021-07-12 PROCEDURE — 1036F TOBACCO NON-USER: CPT | Performed by: NURSE PRACTITIONER

## 2021-07-12 PROCEDURE — 3074F SYST BP LT 130 MM HG: CPT | Performed by: NURSE PRACTITIONER

## 2021-07-12 PROCEDURE — 99396 PREV VISIT EST AGE 40-64: CPT | Performed by: NURSE PRACTITIONER

## 2021-07-12 RX ORDER — SYRINGE WITH NEEDLE, 1 ML 28GX1/2"
SYRINGE, EMPTY DISPOSABLE MISCELLANEOUS
COMMUNITY
Start: 2021-05-18

## 2021-07-12 RX ORDER — FLUTICASONE FUROATE 27.5 UG/1
2 SPRAY, METERED NASAL DAILY
COMMUNITY
Start: 2021-05-18 | End: 2022-07-15

## 2021-07-12 RX ORDER — AMLODIPINE BESYLATE 5 MG/1
5 TABLET ORAL DAILY
Qty: 90 TABLET | Refills: 1 | Status: SHIPPED | OUTPATIENT
Start: 2021-07-12 | End: 2021-09-09

## 2021-07-12 NOTE — PROGRESS NOTES
[facility]        NAME: Jeanmarie Koenig is a 50 y o  male  : 1975    MRN: [id number]  DATE: 2021  TIME: 6:31 PM    Assessment and Plan   Essential hypertension [I10]  1  Essential hypertension  amLODIPine (NORVASC) 5 mg tablet   2  Annual physical exam           Patient Instructions     Patient Instructions   Stop Lisinopril 20mg twice a day and start Amlodipine 5mg daily- can increase to 10mg per day in 1-2 weeks if blood pressure averages higher than 140/90   Call if any questions/concerns  6 month medcheck  2 week blood pressure check in office          Chief Complaint     Chief Complaint   Patient presents with    Physical Exam         History of Present Illness       Here for 6 month medcheck- Lisinopril 20mg twice daily for HTN- he is reporting a dry cough- worsening- concerned this could be related to Lisinopril- more than 6 months      Review of Systems   Review of Systems   Constitutional: Negative for activity change, diaphoresis, fatigue and fever  HENT: Negative for congestion, facial swelling, hearing loss, rhinorrhea, sinus pressure, sinus pain, sneezing, sore throat and voice change  Eyes: Negative for discharge and visual disturbance  Respiratory: Positive for cough  Negative for choking, chest tightness, shortness of breath, wheezing and stridor  Cardiovascular: Negative for chest pain, palpitations and leg swelling  Gastrointestinal: Negative for abdominal distention, abdominal pain, constipation, diarrhea, nausea and vomiting  Endocrine: Negative for polydipsia, polyphagia and polyuria  Genitourinary: Negative for difficulty urinating, dysuria, frequency and urgency  Musculoskeletal: Negative for arthralgias, back pain, gait problem, joint swelling, myalgias, neck pain and neck stiffness  Skin: Negative for color change, rash and wound     Neurological: Negative for dizziness, syncope, speech difficulty, weakness, light-headedness and headaches  Hematological: Negative for adenopathy  Does not bruise/bleed easily  Psychiatric/Behavioral: Negative for agitation, behavioral problems, confusion, hallucinations, sleep disturbance and suicidal ideas  The patient is not nervous/anxious  Current Medications       Current Outpatient Medications:     B-D ALLERGY SYRINGE 1CC/28G 28G X 1/2" 1 ML MISC, 3 SYRINGES PER WEEK FOR ALLERGY INJECTIONS, Disp: , Rfl:     EPINEPHrine (EPIPEN) 0 3 mg/0 3 mL SOAJ, Inject 0 3 mg into a muscle, Disp: , Rfl:     fexofenadine (ALLEGRA) 180 MG tablet, TAKE 1 TABLET (180 MG TOTAL) BY MOUTH DAILY  , Disp: , Rfl:     fluticasone (Flonase Sensimist) 27 5 MCG/SPRAY nasal spray, 2 sprays into each nostril daily, Disp: , Rfl:     montelukast (SINGULAIR) 10 mg tablet, TAKE 1 TABLET BY MOUTH DAILY AT BEDTIME, Disp: 90 tablet, Rfl: 1    Syringe/Needle, Disp, 28G X 1/2" 1 ML MISC, 3 syringes per week for allergy injections, Disp: , Rfl:     amLODIPine (NORVASC) 5 mg tablet, Take 1 tablet (5 mg total) by mouth daily, Disp: 90 tablet, Rfl: 1    fluticasone (FLONASE) 50 mcg/act nasal spray, 1 spray into each nostril 2 (two) times a day as needed for rhinitis, Disp: 1 Bottle, Rfl: 0    Current Allergies     Allergies as of 07/12/2021 - Reviewed 07/12/2021   Allergen Reaction Noted    Pollen extract  06/22/2017            The following portions of the patient's history were reviewed and updated as appropriate: allergies, current medications, past family history, past medical history, past social history, past surgical history and problem list      Past Medical History:   Diagnosis Date    Allergic     Hypertension        Past Surgical History:   Procedure Laterality Date    NOSE SURGERY      WISDOM TOOTH EXTRACTION         Family History   Problem Relation Age of Onset    Depression Mother     Depression Father          Medications have been verified          Objective   /82   Pulse 71   Temp 98 °F (36 7 °C) (Tympanic)   Resp 20   Ht 5' 9" (1 753 m)   Wt 75 8 kg (167 lb)   SpO2 98%   BMI 24 66 kg/m²        Physical Exam     Physical Exam  Vitals and nursing note reviewed  Constitutional:       General: He is not in acute distress  Appearance: Normal appearance  He is well-developed  He is not diaphoretic  HENT:      Head: Normocephalic and atraumatic  Right Ear: Tympanic membrane, ear canal and external ear normal       Left Ear: Tympanic membrane, ear canal and external ear normal       Nose: Nose normal       Mouth/Throat:      Pharynx: Uvula midline  No oropharyngeal exudate  Eyes:      General:         Right eye: No discharge  Left eye: No discharge  Extraocular Movements: Extraocular movements intact  Conjunctiva/sclera: Conjunctivae normal       Pupils: Pupils are equal, round, and reactive to light  Neck:      Thyroid: No thyromegaly  Trachea: No tracheal deviation  Cardiovascular:      Rate and Rhythm: Normal rate and regular rhythm  Heart sounds: Normal heart sounds  No murmur heard  Pulmonary:      Effort: Pulmonary effort is normal  No respiratory distress  Breath sounds: Normal breath sounds  No wheezing  Musculoskeletal:         General: Normal range of motion  Cervical back: Normal range of motion and neck supple  Right lower leg: No edema  Left lower leg: No edema  Lymphadenopathy:      Cervical: No cervical adenopathy  Skin:     General: Skin is warm and dry  Neurological:      Mental Status: He is alert and oriented to person, place, and time  Psychiatric:         Mood and Affect: Mood normal          Behavior: Behavior normal          Thought Content:  Thought content normal          Judgment: Judgment normal

## 2021-07-12 NOTE — PROGRESS NOTES
HPI:  Lisa Bourne is a 50 y o  male here for his yearly health maintenance exam    Patient Active Problem List   Diagnosis    Overweight (BMI 25 0-29  9)    HTN (hypertension), benign    Hypertriglyceridemia    Overweight with body mass index (BMI) of 25 to 25 9 in adult     Past Medical History:   Diagnosis Date    Allergic     Hypertension           PHQ-9 Depression Screening    PHQ-9:   Frequency of the following problems over the past two weeks:      Little interest or pleasure in doing things: 0 - not at all  Feeling down, depressed, or hopeless: 0 - not at all  PHQ-2 Score: 0           Current Outpatient Medications   Medication Sig Dispense Refill    B-D ALLERGY SYRINGE 1CC/28G 28G X 1/2" 1 ML MISC 3 SYRINGES PER WEEK FOR ALLERGY INJECTIONS      EPINEPHrine (EPIPEN) 0 3 mg/0 3 mL SOAJ Inject 0 3 mg into a muscle      fexofenadine (ALLEGRA) 180 MG tablet TAKE 1 TABLET (180 MG TOTAL) BY MOUTH DAILY   fluticasone (Flonase Sensimist) 27 5 MCG/SPRAY nasal spray 2 sprays into each nostril daily      montelukast (SINGULAIR) 10 mg tablet TAKE 1 TABLET BY MOUTH DAILY AT BEDTIME 90 tablet 1    Syringe/Needle, Disp, 28G X 1/2" 1 ML MISC 3 syringes per week for allergy injections      amLODIPine (NORVASC) 5 mg tablet Take 1 tablet (5 mg total) by mouth daily 90 tablet 1    fluticasone (FLONASE) 50 mcg/act nasal spray 1 spray into each nostril 2 (two) times a day as needed for rhinitis 1 Bottle 0     No current facility-administered medications for this visit       Allergies   Allergen Reactions    Pollen Extract      Immunization History   Administered Date(s) Administered    H1N1, All Formulations 01/30/2010    INFLUENZA 10/01/2000, 10/13/2010, 09/29/2016, 09/21/2017, 10/09/2020    Influenza, injectable, quadrivalent, preservative free 0 5 mL 10/14/2019    SARS-CoV-2 / COVID-19 mRNA IM (Pf[employer]) 01/25/2021, 02/15/2021    Td (adult), Unspecified 01/01/2001    Tdap 03/21/2011 Patient Care Team:  Mary Hawkins as PCP - General (Family Medicine)  Mary Hawkins as PCP - [facility]CP-[facility] Attributed-Roster    Review of Systems   Constitutional: Negative for activity change, diaphoresis, fatigue and fever  HENT: Negative for congestion, facial swelling, hearing loss, rhinorrhea, sinus pressure, sinus pain, sneezing, sore throat and voice change  Eyes: Negative for discharge and visual disturbance  Respiratory: Positive for cough (dry cough- worsening- concerned this could be related to Lisinopril- more than 6 months)  Negative for choking, chest tightness, shortness of breath, wheezing and stridor  Cardiovascular: Negative for chest pain, palpitations and leg swelling  Gastrointestinal: Negative for abdominal distention, abdominal pain, constipation, diarrhea, nausea and vomiting  Endocrine: Negative for polydipsia, polyphagia and polyuria  Genitourinary: Negative for difficulty urinating, dysuria, frequency and urgency  Musculoskeletal: Negative for arthralgias, back pain, gait problem, joint swelling, myalgias, neck pain and neck stiffness  Skin: Negative for color change, rash and wound  Neurological: Negative for dizziness, syncope, speech difficulty, weakness, light-headedness and headaches  Hematological: Negative for adenopathy  Does not bruise/bleed easily  Psychiatric/Behavioral: Negative for agitation, behavioral problems, confusion, hallucinations, sleep disturbance and suicidal ideas  The patient is not nervous/anxious  Physical Exam :  Physical Exam  Vitals and nursing note reviewed  Constitutional:       General: He is not in acute distress  Appearance: Normal appearance  He is well-developed  He is not diaphoretic  HENT:      Head: Normocephalic and atraumatic        Right Ear: Tympanic membrane, ear canal and external ear normal       Left Ear: Tympanic membrane, ear canal and external ear normal       Nose: Nose normal       Mouth/Throat:      Pharynx: Uvula midline  No oropharyngeal exudate  Eyes:      General:         Right eye: No discharge  Left eye: No discharge  Conjunctiva/sclera: Conjunctivae normal       Pupils: Pupils are equal, round, and reactive to light  Neck:      Thyroid: No thyromegaly  Trachea: No tracheal deviation  Cardiovascular:      Rate and Rhythm: Normal rate and regular rhythm  Heart sounds: Normal heart sounds  No murmur heard  Pulmonary:      Effort: Pulmonary effort is normal  No respiratory distress  Breath sounds: Normal breath sounds  No wheezing  Musculoskeletal:         General: Normal range of motion  Cervical back: Normal range of motion and neck supple  Lymphadenopathy:      Cervical: No cervical adenopathy  Skin:     General: Skin is warm and dry  Neurological:      Mental Status: He is alert and oriented to person, place, and time  Psychiatric:         Mood and Affect: Mood normal          Behavior: Behavior normal          Thought Content: Thought content normal          Judgment: Judgment normal            Reviewed Updated [facility] Prior Wellness Visits:   Last Health Maintenance visit information was reviewed, patient interviewed , no change since last HM visit yes  Last HM visit information was reviewed, patient interviewed and updates made to the record today yes    Assessment and Plan:  1  Annual physical exam     2   Essential hypertension  amLODIPine (NORVASC) 5 mg tablet       Health Maintenance Due   Topic Date Due    Hepatitis C Screening  Never done    HIV Screening  Never done    DTaP,Tdap,and Td Vaccines (2 - Td or Tdap) 03/21/2021    Annual Physical  07/06/2021    Influenza Vaccine (1) 09/01/2021

## 2021-07-12 NOTE — PATIENT INSTRUCTIONS
Stop Lisinopril 20mg twice a day and start Amlodipine 5mg daily- can increase to 10mg per day in 1-2 weeks if blood pressure averages higher than 140/90   Call if any questions/concerns  6 month [facility]  2 week blood pressure check in office

## 2021-07-26 ENCOUNTER — TELEPHONE (OUTPATIENT)
Dept: FAMILY MEDICINE CLINIC | Facility: CLINIC | Age: 46
End: 2021-07-26

## 2021-07-26 ENCOUNTER — CLINICAL SUPPORT (OUTPATIENT)
Dept: FAMILY MEDICINE CLINIC | Facility: CLINIC | Age: 46
End: 2021-07-26
Payer: COMMERCIAL

## 2021-07-26 VITALS — OXYGEN SATURATION: 99 % | SYSTOLIC BLOOD PRESSURE: 132 MMHG | DIASTOLIC BLOOD PRESSURE: 90 MMHG | HEART RATE: 75 BPM

## 2021-07-26 DIAGNOSIS — Z01.30 BP CHECK: Primary | ICD-10-CM

## 2021-07-26 PROCEDURE — 99211 OFF/OP EST MAY X REQ PHY/QHP: CPT

## 2021-07-26 NOTE — TELEPHONE ENCOUNTER
Patient came in for a BP check it was 132/90, pulse 75, oxygen 99  Spoke with Danay Parikh she is upping his amlodipine to 1 5 tablets daily 7 5mg  Scheduled him for 2 week nurse visit BP check

## 2021-08-09 ENCOUNTER — CLINICAL SUPPORT (OUTPATIENT)
Dept: FAMILY MEDICINE CLINIC | Facility: CLINIC | Age: 46
End: 2021-08-09
Payer: COMMERCIAL

## 2021-08-09 VITALS
DIASTOLIC BLOOD PRESSURE: 80 MMHG | RESPIRATION RATE: 20 BRPM | OXYGEN SATURATION: 98 % | TEMPERATURE: 97.4 F | HEART RATE: 78 BPM | SYSTOLIC BLOOD PRESSURE: 132 MMHG

## 2021-08-09 DIAGNOSIS — I10 HTN (HYPERTENSION), BENIGN: Primary | ICD-10-CM

## 2021-08-09 PROCEDURE — 99211 OFF/OP EST MAY X REQ PHY/QHP: CPT

## 2021-08-12 DIAGNOSIS — Z76.0 MEDICATION REFILL: ICD-10-CM

## 2021-08-12 RX ORDER — MONTELUKAST SODIUM 10 MG/1
TABLET ORAL
Qty: 90 TABLET | Refills: 1 | Status: SHIPPED | OUTPATIENT
Start: 2021-08-12 | End: 2022-02-28

## 2021-09-04 DIAGNOSIS — I10 ESSENTIAL HYPERTENSION: ICD-10-CM

## 2021-09-07 NOTE — TELEPHONE ENCOUNTER
Patient requesting refill(s) of: amlodipine 5 mg daily    Last filled: 7/12/2021 #90 x 1  Last appt:7/12/2021  Next appt:9/13/2021  Pharmacy: [facility]

## 2021-09-09 RX ORDER — AMLODIPINE BESYLATE 5 MG/1
5 TABLET ORAL DAILY
Qty: 180 TABLET | Refills: 1 | Status: SHIPPED | OUTPATIENT
Start: 2021-09-09 | End: 2022-02-14

## 2021-11-01 DIAGNOSIS — K58.0 IRRITABLE BOWEL SYNDROME WITH DIARRHEA: Primary | ICD-10-CM

## 2022-02-08 ENCOUNTER — RA CDI HCC (OUTPATIENT)
Dept: OTHER | Facility: HOSPITAL | Age: 47
End: 2022-02-08

## 2022-02-08 NOTE — PROGRESS NOTES
[facility]  coding opportunities       Chart reviewed, no opportunity found: CHART REVIEWED, NO OPPORTUNITY FOUND                        Patients insurance company: [employer] (Medicare Advantage and Commercial)

## 2022-02-14 ENCOUNTER — OFFICE VISIT (OUTPATIENT)
Dept: FAMILY MEDICINE CLINIC | Facility: CLINIC | Age: 47
End: 2022-02-14
Payer: COMMERCIAL

## 2022-02-14 VITALS
HEIGHT: 69 IN | RESPIRATION RATE: 19 BRPM | HEART RATE: 73 BPM | BODY MASS INDEX: 26.36 KG/M2 | TEMPERATURE: 98.2 F | OXYGEN SATURATION: 98 % | SYSTOLIC BLOOD PRESSURE: 139 MMHG | DIASTOLIC BLOOD PRESSURE: 89 MMHG | WEIGHT: 178 LBS

## 2022-02-14 DIAGNOSIS — E78.1 HYPERTRIGLYCERIDEMIA: ICD-10-CM

## 2022-02-14 DIAGNOSIS — R53.83 FATIGUE, UNSPECIFIED TYPE: ICD-10-CM

## 2022-02-14 DIAGNOSIS — M54.2 NECK PAIN: ICD-10-CM

## 2022-02-14 DIAGNOSIS — E55.9 VITAMIN D DEFICIENCY: ICD-10-CM

## 2022-02-14 DIAGNOSIS — R20.0 LEG NUMBNESS: ICD-10-CM

## 2022-02-14 DIAGNOSIS — E66.3 OVERWEIGHT WITH BODY MASS INDEX (BMI) OF 26 TO 26.9 IN ADULT: ICD-10-CM

## 2022-02-14 DIAGNOSIS — R42 DIZZINESS: ICD-10-CM

## 2022-02-14 DIAGNOSIS — I10 HTN (HYPERTENSION), BENIGN: Primary | ICD-10-CM

## 2022-02-14 PROCEDURE — 99214 OFFICE O/P EST MOD 30 MIN: CPT | Performed by: NURSE PRACTITIONER

## 2022-02-14 PROCEDURE — 1036F TOBACCO NON-USER: CPT | Performed by: NURSE PRACTITIONER

## 2022-02-14 PROCEDURE — 3725F SCREEN DEPRESSION PERFORMED: CPT | Performed by: NURSE PRACTITIONER

## 2022-02-14 PROCEDURE — 3008F BODY MASS INDEX DOCD: CPT | Performed by: NURSE PRACTITIONER

## 2022-02-14 RX ORDER — LISINOPRIL 20 MG/1
20 TABLET ORAL 2 TIMES DAILY
Qty: 180 TABLET | Refills: 1 | Status: SHIPPED | OUTPATIENT
Start: 2022-02-14 | End: 2022-08-10

## 2022-02-14 NOTE — PROGRESS NOTES
[facility]        NAME: Freddie Moya is a 48 y o  male  : 1975    MRN: [id number]  DATE: 2022  TIME: 5:51 PM    Assessment and Plan   HTN (hypertension), benign [I10]  1  HTN (hypertension), benign  Comprehensive metabolic panel    lisinopril (ZESTRIL) 20 mg tablet   2  Neck pain  Ambulatory Referral to Chiropractic   3  Leg numbness  Lyme Antibody Profile with reflex to WB    Ambulatory Referral to Chiropractic   4  Dizziness  CBC and differential    Lyme Antibody Profile with reflex to WB    Testosterone, free, total   5  Fatigue, unspecified type  CBC and differential    Vitamin D 25 hydroxy    TSH, 3rd generation with Free T4 reflex    Testosterone, free, total   6  Hypertriglyceridemia  Lipid panel   7  Overweight with body mass index (BMI) of 26 to 26 9 in adult       BMI Counseling: Body mass index is 26 29 kg/m²  The BMI is above normal  Nutrition recommendations include decreasing portion sizes, encouraging healthy choices of fruits and vegetables, decreasing fast food intake, consuming healthier snacks, limiting drinks that contain sugar, moderation in carbohydrate intake and increasing intake of lean protein  Exercise recommendations include exercising 3-5 times per week  Rationale for BMI follow-up plan is due to patient being overweight or obese  Depression Screening and Follow-up Plan: Patient was screened for depression during today's encounter  They screened negative with a PHQ-2 score of 0          Patient Instructions     Patient Instructions   Stop Amlodipine, restart Lisinopril 20mg twice a day  Get bloodwork done as ordered (Saturday hours 7-12, no appointment)  Consider chiropractor- referral given for Dr [facility]  3 week follow up or call sooner for problems/concerns            Chief Complaint     Chief Complaint   Patient presents with    Follow-up     Light headed and fatigue he thinks from BP meds         History of Present Illness Here for 6 month layla-  He liked the Lisinopril better than Amlodipine- he now gets vertigo (room spinning dizziness), lightheaded feeling, and fatigue  He stopped the Lisinopril because he had a cough- the cough has not resolved since stopping Lisinopril  He is taking his Allegra and Singulair every day for allergies  Reports some right leg numbness- worse with sitting, not with walking, reports some neck pain and mild headache- is interested in chiropractor care and bloodwork      Review of Systems   Review of Systems   Constitutional: Positive for fatigue  Negative for activity change, diaphoresis and fever  HENT: Negative for congestion, facial swelling, hearing loss, rhinorrhea, sinus pressure, sinus pain, sneezing, sore throat and voice change  Eyes: Negative for discharge and visual disturbance  Respiratory: Negative for cough, choking, chest tightness, shortness of breath, wheezing and stridor  Cardiovascular: Negative for chest pain, palpitations and leg swelling  Gastrointestinal: Negative for abdominal distention, abdominal pain, constipation, diarrhea, nausea and vomiting  Endocrine: Negative for polydipsia, polyphagia and polyuria  Genitourinary: Negative for difficulty urinating, dysuria, frequency and urgency  Musculoskeletal: Positive for arthralgias, neck pain and neck stiffness  Skin: Negative for color change, rash and wound  Neurological: Positive for dizziness, light-headedness, numbness and headaches  Negative for syncope, speech difficulty and weakness  Hematological: Negative for adenopathy  Does not bruise/bleed easily  Psychiatric/Behavioral: Negative for agitation, behavioral problems, confusion, hallucinations, sleep disturbance and suicidal ideas  The patient is not nervous/anxious            Current Medications       Current Outpatient Medications:     B-D ALLERGY SYRINGE 1CC/28G 28G X 1/2" 1 ML MISC, 3 SYRINGES PER WEEK FOR ALLERGY INJECTIONS, Disp: , Rfl:     EPINEPHrine (EPIPEN) 0 3 mg/0 3 mL SOAJ, Inject 0 3 mg into a muscle, Disp: , Rfl:     fexofenadine (ALLEGRA) 180 MG tablet, TAKE 1 TABLET (180 MG TOTAL) BY MOUTH DAILY  , Disp: , Rfl:     fluticasone (FLONASE) 50 mcg/act nasal spray, 1 spray into each nostril 2 (two) times a day as needed for rhinitis, Disp: 1 Bottle, Rfl: 0    montelukast (SINGULAIR) 10 mg tablet, TAKE 1 TABLET BY MOUTH DAILY AT BEDTIME, Disp: 90 tablet, Rfl: 1    Syringe/Needle, Disp, 28G X 1/2" 1 ML MISC, 3 syringes per week for allergy injections, Disp: , Rfl:     fluticasone (Flonase Sensimist) 27 5 MCG/SPRAY nasal spray, 2 sprays into each nostril daily (Patient not taking: Reported on 8/9/2021), Disp: , Rfl:     lisinopril (ZESTRIL) 20 mg tablet, Take 1 tablet (20 mg total) by mouth 2 (two) times a day, Disp: 180 tablet, Rfl: 1    Current Allergies     Allergies as of 02/14/2022 - Reviewed 02/14/2022   Allergen Reaction Noted    Pollen extract  06/22/2017            The following portions of the patient's history were reviewed and updated as appropriate: allergies, current medications, past family history, past medical history, past social history, past surgical history and problem list      Past Medical History:   Diagnosis Date    Allergic     Hypertension        Past Surgical History:   Procedure Laterality Date    NOSE SURGERY      WISDOM TOOTH EXTRACTION         Family History   Problem Relation Age of Onset    Depression Mother     Depression Father          Medications have been verified  Objective   /89 (BP Location: Left arm, Patient Position: Sitting, Cuff Size: Standard)   Pulse 73   Temp 98 2 °F (36 8 °C)   Resp 19   Ht 5' 9" (1 753 m)   Wt 80 7 kg (178 lb)   SpO2 98%   BMI 26 29 kg/m²        Physical Exam     Physical Exam  Vitals and nursing note reviewed  Constitutional:       General: He is not in acute distress  Appearance: Normal appearance  He is well-developed   He is not diaphoretic  HENT:      Head: Normocephalic and atraumatic  Right Ear: Tympanic membrane, ear canal and external ear normal       Left Ear: Tympanic membrane, ear canal and external ear normal       Nose: Nose normal       Mouth/Throat:      Mouth: Mucous membranes are moist       Pharynx: Oropharynx is clear  Uvula midline  No oropharyngeal exudate  Eyes:      General:         Right eye: No discharge  Left eye: No discharge  Extraocular Movements: Extraocular movements intact  Conjunctiva/sclera: Conjunctivae normal       Pupils: Pupils are equal, round, and reactive to light  Neck:      Thyroid: No thyromegaly  Trachea: No tracheal deviation  Cardiovascular:      Rate and Rhythm: Normal rate and regular rhythm  Heart sounds: Normal heart sounds  No murmur heard  Pulmonary:      Effort: Pulmonary effort is normal  No respiratory distress  Breath sounds: Normal breath sounds  No wheezing  Musculoskeletal:         General: Normal range of motion  Cervical back: Normal range of motion and neck supple  Lymphadenopathy:      Cervical: No cervical adenopathy  Skin:     General: Skin is warm and dry  Neurological:      Mental Status: He is alert and oriented to person, place, and time  Psychiatric:         Mood and Affect: Mood normal          Behavior: Behavior normal          Thought Content:  Thought content normal          Judgment: Judgment normal

## 2022-02-14 NOTE — PATIENT INSTRUCTIONS
Stop Amlodipine, restart Lisinopril 20mg twice a day  Get bloodwork done as ordered (Saturday hours 7-12, no appointment)  Consider chiropractor- referral given for Dr Fabrice Angeles  3 week follow up or call sooner for problems/concerns

## 2022-02-18 ENCOUNTER — APPOINTMENT (OUTPATIENT)
Dept: LAB | Facility: CLINIC | Age: 47
End: 2022-02-18
Payer: COMMERCIAL

## 2022-02-18 ENCOUNTER — TELEPHONE (OUTPATIENT)
Dept: FAMILY MEDICINE CLINIC | Facility: CLINIC | Age: 47
End: 2022-02-18

## 2022-02-18 DIAGNOSIS — R42 DIZZINESS: Primary | ICD-10-CM

## 2022-02-18 DIAGNOSIS — R20.0 LEG NUMBNESS: ICD-10-CM

## 2022-02-18 DIAGNOSIS — I10 HTN (HYPERTENSION), BENIGN: ICD-10-CM

## 2022-02-18 DIAGNOSIS — R53.83 FATIGUE, UNSPECIFIED TYPE: ICD-10-CM

## 2022-02-18 DIAGNOSIS — R42 DIZZINESS: ICD-10-CM

## 2022-02-18 DIAGNOSIS — E78.1 HYPERTRIGLYCERIDEMIA: ICD-10-CM

## 2022-02-18 LAB
25(OH)D3 SERPL-MCNC: 27.1 NG/ML (ref 30–100)
ALBUMIN SERPL BCP-MCNC: 3.8 G/DL (ref 3.5–5)
ALP SERPL-CCNC: 51 U/L (ref 46–116)
ALT SERPL W P-5'-P-CCNC: 43 U/L (ref 12–78)
ANION GAP SERPL CALCULATED.3IONS-SCNC: 7 MMOL/L (ref 4–13)
AST SERPL W P-5'-P-CCNC: 23 U/L (ref 5–45)
BASOPHILS # BLD AUTO: 0.01 THOUSANDS/ΜL (ref 0–0.1)
BASOPHILS NFR BLD AUTO: 0 % (ref 0–1)
BILIRUB SERPL-MCNC: 0.68 MG/DL (ref 0.2–1)
BUN SERPL-MCNC: 14 MG/DL (ref 5–25)
CALCIUM SERPL-MCNC: 9.3 MG/DL (ref 8.3–10.1)
CHLORIDE SERPL-SCNC: 106 MMOL/L (ref 100–108)
CHOLEST SERPL-MCNC: 202 MG/DL
CO2 SERPL-SCNC: 26 MMOL/L (ref 21–32)
CREAT SERPL-MCNC: 1.05 MG/DL (ref 0.6–1.3)
EOSINOPHIL # BLD AUTO: 0.07 THOUSAND/ΜL (ref 0–0.61)
EOSINOPHIL NFR BLD AUTO: 2 % (ref 0–6)
ERYTHROCYTE [DISTWIDTH] IN BLOOD BY AUTOMATED COUNT: 12 % (ref 11.6–15.1)
GFR SERPL CREATININE-BSD FRML MDRD: 83 ML/MIN/1.73SQ M
GLUCOSE P FAST SERPL-MCNC: 101 MG/DL (ref 65–99)
HCT VFR BLD AUTO: 42 % (ref 36.5–49.3)
HDLC SERPL-MCNC: 34 MG/DL
HGB BLD-MCNC: 14.3 G/DL (ref 12–17)
IMM GRANULOCYTES # BLD AUTO: 0.02 THOUSAND/UL (ref 0–0.2)
IMM GRANULOCYTES NFR BLD AUTO: 1 % (ref 0–2)
LDLC SERPL CALC-MCNC: 120 MG/DL (ref 0–100)
LYMPHOCYTES # BLD AUTO: 1.16 THOUSANDS/ΜL (ref 0.6–4.47)
LYMPHOCYTES NFR BLD AUTO: 31 % (ref 14–44)
MCH RBC QN AUTO: 28.8 PG (ref 26.8–34.3)
MCHC RBC AUTO-ENTMCNC: 34 G/DL (ref 31.4–37.4)
MCV RBC AUTO: 85 FL (ref 82–98)
MONOCYTES # BLD AUTO: 0.39 THOUSAND/ΜL (ref 0.17–1.22)
MONOCYTES NFR BLD AUTO: 10 % (ref 4–12)
NEUTROPHILS # BLD AUTO: 2.1 THOUSANDS/ΜL (ref 1.85–7.62)
NEUTS SEG NFR BLD AUTO: 56 % (ref 43–75)
NONHDLC SERPL-MCNC: 168 MG/DL
NRBC BLD AUTO-RTO: 0 /100 WBCS
PLATELET # BLD AUTO: 193 THOUSANDS/UL (ref 149–390)
PMV BLD AUTO: 10.7 FL (ref 8.9–12.7)
POTASSIUM SERPL-SCNC: 4.1 MMOL/L (ref 3.5–5.3)
PROT SERPL-MCNC: 7.5 G/DL (ref 6.4–8.2)
RBC # BLD AUTO: 4.96 MILLION/UL (ref 3.88–5.62)
SODIUM SERPL-SCNC: 139 MMOL/L (ref 136–145)
TRIGL SERPL-MCNC: 238 MG/DL
TSH SERPL DL<=0.05 MIU/L-ACNC: 2.46 UIU/ML (ref 0.36–3.74)
WBC # BLD AUTO: 3.75 THOUSAND/UL (ref 4.31–10.16)

## 2022-02-18 PROCEDURE — 84403 ASSAY OF TOTAL TESTOSTERONE: CPT

## 2022-02-18 PROCEDURE — 86618 LYME DISEASE ANTIBODY: CPT

## 2022-02-18 PROCEDURE — 82306 VITAMIN D 25 HYDROXY: CPT

## 2022-02-18 PROCEDURE — 36415 COLL VENOUS BLD VENIPUNCTURE: CPT

## 2022-02-18 PROCEDURE — 84443 ASSAY THYROID STIM HORMONE: CPT

## 2022-02-18 PROCEDURE — 84402 ASSAY OF FREE TESTOSTERONE: CPT

## 2022-02-18 PROCEDURE — 85025 COMPLETE CBC W/AUTO DIFF WBC: CPT

## 2022-02-18 PROCEDURE — 80061 LIPID PANEL: CPT

## 2022-02-18 PROCEDURE — 80053 COMPREHEN METABOLIC PANEL: CPT

## 2022-02-19 LAB
B BURGDOR IGG+IGM SER-ACNC: 73
TESTOST FREE SERPL-MCNC: 12.2 PG/ML (ref 6.8–21.5)
TESTOST SERPL-MCNC: 446 NG/DL (ref 264–916)

## 2022-02-21 ENCOUNTER — TELEPHONE (OUTPATIENT)
Dept: NEUROLOGY | Facility: CLINIC | Age: 47
End: 2022-02-21

## 2022-02-21 NOTE — TELEPHONE ENCOUNTER
Patient called to become a new patient due to headaches  This was clarified from referral where it says dizziness  Patient hasn't had testing done  Triage intake sent

## 2022-02-22 DIAGNOSIS — E78.1 HYPERTRIGLYCERIDEMIA: Primary | ICD-10-CM

## 2022-02-22 RX ORDER — ATORVASTATIN CALCIUM 10 MG/1
10 TABLET, FILM COATED ORAL DAILY
Qty: 90 TABLET | Refills: 3 | Status: SHIPPED | OUTPATIENT
Start: 2022-02-22

## 2022-02-26 DIAGNOSIS — Z76.0 MEDICATION REFILL: ICD-10-CM

## 2022-02-28 RX ORDER — MONTELUKAST SODIUM 10 MG/1
TABLET ORAL
Qty: 90 TABLET | Refills: 1 | Status: SHIPPED | OUTPATIENT
Start: 2022-02-28

## 2022-02-28 NOTE — TELEPHONE ENCOUNTER
Patient requesting refill(s) of:  montelukast (SINGULAIR) 10 mg tablet  Last filled:8/12/21  Last appt:2/14/22  Next appt:3/7/22  Pharmacy: [address]

## 2022-03-01 ENCOUNTER — TELEPHONE (OUTPATIENT)
Dept: NEUROLOGY | Facility: CLINIC | Age: 47
End: 2022-03-01

## 2022-03-01 NOTE — TELEPHONE ENCOUNTER
Patient called in to schedule new patient appt  Triage questions were already completed  Scheduled with Dr Bryson Harper in [place] 8/3 @ 3:00 patient needs first appt or last of day   Added to wait list

## 2022-03-07 ENCOUNTER — OFFICE VISIT (OUTPATIENT)
Dept: FAMILY MEDICINE CLINIC | Facility: CLINIC | Age: 47
End: 2022-03-07
Payer: COMMERCIAL

## 2022-03-07 VITALS
HEART RATE: 78 BPM | OXYGEN SATURATION: 98 % | TEMPERATURE: 98 F | BODY MASS INDEX: 25.8 KG/M2 | HEIGHT: 69 IN | SYSTOLIC BLOOD PRESSURE: 128 MMHG | RESPIRATION RATE: 20 BRPM | WEIGHT: 174.2 LBS | DIASTOLIC BLOOD PRESSURE: 78 MMHG

## 2022-03-07 DIAGNOSIS — E78.1 HYPERTRIGLYCERIDEMIA: ICD-10-CM

## 2022-03-07 DIAGNOSIS — Z13.1 SCREENING FOR DIABETES MELLITUS: ICD-10-CM

## 2022-03-07 DIAGNOSIS — I10 HTN (HYPERTENSION), BENIGN: Primary | ICD-10-CM

## 2022-03-07 PROCEDURE — 99213 OFFICE O/P EST LOW 20 MIN: CPT | Performed by: NURSE PRACTITIONER

## 2022-03-07 PROCEDURE — 3008F BODY MASS INDEX DOCD: CPT | Performed by: NURSE PRACTITIONER

## 2022-03-07 PROCEDURE — 1036F TOBACCO NON-USER: CPT | Performed by: NURSE PRACTITIONER

## 2022-03-07 PROCEDURE — 3725F SCREEN DEPRESSION PERFORMED: CPT | Performed by: NURSE PRACTITIONER

## 2022-03-07 RX ORDER — CHOLECALCIFEROL (VITAMIN D3) 125 MCG
5000 CAPSULE ORAL DAILY
COMMUNITY

## 2022-03-07 NOTE — PATIENT INSTRUCTIONS
Continue chiropractic care  Keep appointment with neurology if symptoms continue  Get repeat labs before next visit   5 month follow up or call sooner for problems/concerns

## 2022-03-07 NOTE — PROGRESS NOTES
[facility]        NAME: Angella Padilla is a 48 y o  male  : 1975    MRN: [id number]  DATE: 2022  TIME: 4:30 PM    Assessment and Plan   HTN (hypertension), benign [I10]  1  HTN (hypertension), benign  Comprehensive metabolic panel    CBC and differential   2  Hypertriglyceridemia  Lipid panel   3  Screening for diabetes mellitus  HEMOGLOBIN A1C W/ EAG ESTIMATION         Patient Instructions     Patient Instructions   Continue chiropractic care  Keep appointment with neurology if symptoms continue  Get repeat labs before next visit   5 month follow up or call sooner for problems/concerns          Chief Complaint     Chief Complaint   Patient presents with    Follow-up         History of Present Illness       Here for follow up BP, labs, and neck pain/headaches  He reports his neck pain and headaches have improved from 6/10 to 3/10  He did go to the chiropractor 2x and does think this is helping      Review of Systems   Review of Systems   Constitutional: Negative for activity change, diaphoresis, fatigue and fever  HENT: Negative for congestion, facial swelling, hearing loss, rhinorrhea, sinus pressure, sinus pain, sneezing, sore throat and voice change  Eyes: Negative for discharge and visual disturbance  Respiratory: Negative for cough, choking, chest tightness, shortness of breath, wheezing and stridor  Cardiovascular: Negative for chest pain, palpitations and leg swelling  Gastrointestinal: Negative for abdominal distention, abdominal pain, constipation, diarrhea, nausea and vomiting  Endocrine: Negative for polydipsia, polyphagia and polyuria  Genitourinary: Negative for difficulty urinating, dysuria, frequency and urgency  Musculoskeletal: Positive for neck pain and neck stiffness  Skin: Negative for color change, rash and wound  Neurological: Positive for headaches   Negative for dizziness, syncope, speech difficulty, weakness and light-headedness  Hematological: Negative for adenopathy  Does not bruise/bleed easily  Psychiatric/Behavioral: Negative for agitation, behavioral problems, confusion, hallucinations, sleep disturbance and suicidal ideas  The patient is not nervous/anxious  Current Medications       Current Outpatient Medications:     atorvastatin (LIPITOR) 10 mg tablet, Take 1 tablet (10 mg total) by mouth daily, Disp: 90 tablet, Rfl: 3    B-D ALLERGY SYRINGE 1CC/28G 28G X 1/2" 1 ML MISC, 3 SYRINGES PER WEEK FOR ALLERGY INJECTIONS, Disp: , Rfl:     Cholecalciferol (Vitamin D) 125 MCG (5000 UT) CAPS, Take 5,000 Units by mouth in the morning, Disp: , Rfl:     EPINEPHrine (EPIPEN) 0 3 mg/0 3 mL SOAJ, Inject 0 3 mg into a muscle, Disp: , Rfl:     fexofenadine (ALLEGRA) 180 MG tablet, TAKE 1 TABLET (180 MG TOTAL) BY MOUTH DAILY  , Disp: , Rfl:     lisinopril (ZESTRIL) 20 mg tablet, Take 1 tablet (20 mg total) by mouth 2 (two) times a day, Disp: 180 tablet, Rfl: 1    montelukast (SINGULAIR) 10 mg tablet, TAKE 1 TABLET BY MOUTH DAILY AT BEDTIME, Disp: 90 tablet, Rfl: 1    Syringe/Needle, Disp, 28G X 1/2" 1 ML MISC, 3 syringes per week for allergy injections, Disp: , Rfl:     fluticasone (Flonase Sensimist) 27 5 MCG/SPRAY nasal spray, 2 sprays into each nostril daily (Patient not taking: Reported on 8/9/2021), Disp: , Rfl:     fluticasone (FLONASE) 50 mcg/act nasal spray, 1 spray into each nostril 2 (two) times a day as needed for rhinitis, Disp: 1 Bottle, Rfl: 0    Current Allergies     Allergies as of 03/07/2022 - Reviewed 03/07/2022   Allergen Reaction Noted    Pollen extract  06/22/2017            The following portions of the patient's history were reviewed and updated as appropriate: allergies, current medications, past family history, past medical history, past social history, past surgical history and problem list      Past Medical History:   Diagnosis Date    Allergic     Hypertension        Past Surgical History:   Procedure Laterality Date    NOSE SURGERY      WISDOM TOOTH EXTRACTION         Family History   Problem Relation Age of Onset    Depression Mother     Depression Father     Hypertension Family          Medications have been verified  Objective   /78   Pulse 78   Temp 98 °F (36 7 °C) (Tympanic)   Resp 20   Ht 5' 9" (1 753 m)   Wt 79 kg (174 lb 3 2 oz)   SpO2 98%   BMI 25 72 kg/m²        Physical Exam     Physical Exam  Vitals and nursing note reviewed  Constitutional:       General: He is not in acute distress  Appearance: Normal appearance  He is well-developed  He is not diaphoretic  Neck:      Thyroid: No thyromegaly  Trachea: No tracheal deviation  Cardiovascular:      Rate and Rhythm: Normal rate and regular rhythm  Heart sounds: Normal heart sounds  No murmur heard  Pulmonary:      Effort: Pulmonary effort is normal  No respiratory distress  Breath sounds: Normal breath sounds  No wheezing  Musculoskeletal:         General: Tenderness (neck- chronic- improved from last visit) present  No deformity  Cervical back: Normal range of motion and neck supple  Skin:     General: Skin is warm and dry  Neurological:      Mental Status: He is alert and oriented to person, place, and time  Psychiatric:         Mood and Affect: Mood normal          Speech: Speech normal          Behavior: Behavior normal          Thought Content:  Thought content normal          Judgment: Judgment normal

## 2022-03-10 DIAGNOSIS — R09.81 HEAD CONGESTION: Primary | ICD-10-CM

## 2022-03-10 RX ORDER — PREDNISONE 20 MG/1
20 TABLET ORAL 2 TIMES DAILY WITH MEALS
Qty: 10 TABLET | Refills: 0 | Status: SHIPPED | OUTPATIENT
Start: 2022-03-10 | End: 2022-03-15

## 2022-03-10 NOTE — TELEPHONE ENCOUNTER
Spoke with patient  He would like the medication sent to [facility]  Also note sent to [facility]  [facility] medication for approval and [facility]

## 2022-03-10 NOTE — TELEPHONE ENCOUNTER
Patient called was just here few days ago to see PCP and started on Tuesday with sore throat, cough, congestion, both ears blocked, sinus pressure and No Fever  Patient asking if PCP could please call something in for him, MARGARET and sent to [facility]  Patient will also need a note for work  Any questions please call [phone]   Took home rapid COVID test and it was Negative

## 2022-07-06 ENCOUNTER — TELEPHONE (OUTPATIENT)
Dept: FAMILY MEDICINE CLINIC | Facility: CLINIC | Age: 47
End: 2022-07-06

## 2022-07-06 DIAGNOSIS — R05.9 COUGH: Primary | ICD-10-CM

## 2022-07-06 DIAGNOSIS — R19.7 DIARRHEA, UNSPECIFIED TYPE: ICD-10-CM

## 2022-07-06 DIAGNOSIS — R09.81 NASAL CONGESTION: ICD-10-CM

## 2022-07-06 DIAGNOSIS — J02.9 SORE THROAT: ICD-10-CM

## 2022-07-06 PROCEDURE — U0003 INFECTIOUS AGENT DETECTION BY NUCLEIC ACID (DNA OR RNA); SEVERE ACUTE RESPIRATORY SYNDROME CORONAVIRUS 2 (SARS-COV-2) (CORONAVIRUS DISEASE [COVID-19]), AMPLIFIED PROBE TECHNIQUE, MAKING USE OF HIGH THROUGHPUT TECHNOLOGIES AS DESCRIBED BY CMS-2020-01-R: HCPCS | Performed by: NURSE PRACTITIONER

## 2022-07-06 PROCEDURE — U0005 INFEC AGEN DETEC AMPLI PROBE: HCPCS | Performed by: NURSE PRACTITIONER

## 2022-07-06 NOTE — TELEPHONE ENCOUNTER
Patient called the office back  Patient reported having sore throat, cough, congestion, and diarrhea  Denies any headaches, fevers, or any other symptoms  Patient said symptoms started yesterday  Reported that he took an antidiarrheal and diarrhea has subsided since then  Informed patient of 5 day quarantine, taking OTC cough/cold medications, staying hydrated, and getting rest    Patient would like work note sent via [address]

## 2022-07-06 NOTE — TELEPHONE ENCOUNTER
After note was sent to patient, patient sent picture of home Covid test  Unable to see second line on test  Asked patient to come in for PCR testing

## 2022-07-07 LAB — SARS-COV-2 RNA RESP QL NAA+PROBE: POSITIVE

## 2022-07-12 ENCOUNTER — HOSPITAL ENCOUNTER (EMERGENCY)
Facility: HOSPITAL | Age: 47
Discharge: HOME | End: 2022-07-12
Attending: EMERGENCY MEDICINE
Payer: COMMERCIAL

## 2022-07-12 VITALS
SYSTOLIC BLOOD PRESSURE: 149 MMHG | TEMPERATURE: 98 F | DIASTOLIC BLOOD PRESSURE: 85 MMHG | WEIGHT: 175 LBS | OXYGEN SATURATION: 96 % | BODY MASS INDEX: 25.84 KG/M2 | RESPIRATION RATE: 16 BRPM | HEART RATE: 71 BPM

## 2022-07-12 DIAGNOSIS — F41.9 ANXIETY: Primary | ICD-10-CM

## 2022-07-12 LAB
AMPHETAMINES SERPL QL SCN: NEGATIVE
BARBITURATES UR QL: NEGATIVE
BENZODIAZ UR QL: NEGATIVE
COCAINE UR QL: NEGATIVE
ETHANOL EXG-MCNC: 0 MG/DL
METHADONE UR QL: NEGATIVE
OPIATES UR QL SCN: NEGATIVE
OXYCODONE+OXYMORPHONE UR QL SCN: NEGATIVE
PCP UR QL: NEGATIVE
THC UR QL: NEGATIVE

## 2022-07-12 PROCEDURE — 99242 OFF/OP CONSLTJ NEW/EST SF 20: CPT | Performed by: GENERAL PRACTICE

## 2022-07-12 PROCEDURE — 99284 EMERGENCY DEPT VISIT MOD MDM: CPT | Performed by: EMERGENCY MEDICINE

## 2022-07-12 PROCEDURE — 80307 DRUG TEST PRSMV CHEM ANLYZR: CPT | Performed by: EMERGENCY MEDICINE

## 2022-07-12 PROCEDURE — 99284 EMERGENCY DEPT VISIT MOD MDM: CPT

## 2022-07-12 PROCEDURE — 99282 EMERGENCY DEPT VISIT SF MDM: CPT | Performed by: GENERAL PRACTICE

## 2022-07-12 PROCEDURE — 82075 ASSAY OF BREATH ETHANOL: CPT | Performed by: EMERGENCY MEDICINE

## 2022-07-12 RX ORDER — ESCITALOPRAM OXALATE 10 MG/1
5 TABLET ORAL ONCE
Status: COMPLETED | OUTPATIENT
Start: 2022-07-12 | End: 2022-07-12

## 2022-07-12 RX ORDER — LORAZEPAM 1 MG/1
1 TABLET ORAL ONCE
Status: COMPLETED | OUTPATIENT
Start: 2022-07-12 | End: 2022-07-12

## 2022-07-12 RX ORDER — GABAPENTIN 300 MG/1
300 CAPSULE ORAL 2 TIMES DAILY PRN
Qty: 30 CAPSULE | Refills: 0 | Status: SHIPPED | OUTPATIENT
Start: 2022-07-12 | End: 2022-07-15

## 2022-07-12 RX ORDER — ESCITALOPRAM OXALATE 5 MG/1
TABLET ORAL
Qty: 67 TABLET | Refills: 1 | Status: SHIPPED | OUTPATIENT
Start: 2022-07-12 | End: 2022-07-15 | Stop reason: SDUPTHER

## 2022-07-12 RX ADMIN — ESCITALOPRAM OXALATE 5 MG: 10 TABLET ORAL at 22:58

## 2022-07-12 RX ADMIN — LORAZEPAM 1 MG: 1 TABLET ORAL at 21:58

## 2022-07-13 NOTE — CONSULTS
TeleConsultation - [address] 48 y o  male MRN: [id number]  Unit/Bed#: OVR 01 Encounter: [id number]        REQUIRED DOCUMENTATION:     1  This service was provided via Telemedicine  2  Provider located at [facility]   3  TeleMed provider: Clinton Pimentel MD   4  Identify all parties in room with patient during tele consult: Patient  5  Patient was then informed that this was a Telemedicine visit and that the exam was being conducted confidentially over secure lines  My office door was closed  No one else was in the room  Patient acknowledged consent and understanding of privacy and security of the Telemedicine visit, and gave us permission to have the assistant stay in the room in order to assist with the history and to conduct the exam   I informed the patient that I have reviewed their record in Epic and presented the opportunity for them to ask any questions regarding the visit today  The patient agreed to participate  Assessment/Plan     Assessment:  Anibal Small is a 49 y/o male with PMH significant for HTN thaht presented with a CC of Anxiety  Patient displays signs and symptoms consistent with QUIN  Patient with acute worsening in his anxiety related to recent COVID infection  Patient does not require voluntary or involuntary IP psychiatric admission nor 1:1  Recommend starting Lexapro 5 mg x7 days then Lexapro 10 mg qday thereafter and Gabapentin 300 mg BID PRN for acute anxiety and sleep  Plan:   Risks, benefits and possible side effects of Medications:   Risks, benefits, and possible side effects of medications explained to patient and patient verbalizes understanding        -Recommend starting Lexapro 5 mg x7 days then Lexapro 10 mg qday thereafter and Gabapentin 300 mg BID PRN for acute anxiety and sleep         Chief Complaint: " I have just been really worried"    History of Present Illness     Reason for Consult / Principal Problem: Psych Evaluation     Per Crisis Worker Note by Matias Lima  Pt presents to emergency department due to anxiety, depression  Pt appeared calm and cooperative  Pt oriented x4  Pt presents with good eye contact  Pt accompanied by his wife  Pt currently working  Pt has no psychiatric history  Pt not on any medication  Pt states he tested positive for Covid on 7/6/2022 and since last Friday he began experiencing anxiety and depression  Pt states he was worried about his mother and father who he did see that they may get Covid as well  Pt's wife reports that pt's mother was angry at the pt due to this fact that he got Covid  Pt expressed his anxiety as worrying about his parents, not being fully able to focus at work  Pt also reported feeling depressed but could not elaborate on it  Pt states since Friday he has once suicidal thoughts but more like it was his fault that possibly his parents may get Covid  Pt denies any intentions of hurting himself, no plan, no previous SI or attempts  Pt denies any homicidal thoughts, intentions or plans  Pt denies self harming but wife states he slapped himself on face a couple of times, blaming self  No hallucinations reported  It seems as pt may be slightly paranoid as he states he assumes that his parents subtracted Covid from him already  Pt's wife very firm on inpatient hospitalization but pt worries about being stuck in emergency department for days and worrying even more  Crisis informed pt about treatment options  Pt asked to speak with psychiatrist to see if possibly medication could be recommended to help  Crisis spoke with attending Gil Ac who placed psych consult  No other stressors, trauma or events were reported  No drug use  Patient states that he has been dealing with worsening anxiety as  he tested positive for COVID-19 and the potential risk to his parents and that his parents were angry   Patient states that he finds himself worrying about worst case scenarios, difficulty concentrating, and difficulty sleeping due to racing thoughts  Patient states he had some passive SI when he found out his mother was angry with him and that he didn't deserve to live  Patient denied any active SI/HI or other acute psychiatric complaints  Inpatient consult to Psychiatry  Consult performed by: Bc Franco MD  Consult ordered by: Marc Lang DO          Psychiatric Review Of Systems:  sleep: yes  appetite changes: yes  weight changes: no  energy/anergy: no  interest/pleasure/anhedonia: no  somatic symptoms: no  anxiety/panic: yes  johnny: no  guilty/hopeless: no  self injurious behavior/risky behavior: no    Historical Information   Past Psychiatric History:   None  Currently in treatment with None  Past Suicide attempts: None  Past Violent behavior: None  Past Psychiatric medication trial: None    Substance Abuse History: Denied any current or past illicit substance use  Use of Alcohol: denied    Longest clean time: years  History of IP/OP rehabilitation program: None  Smoking history: None  Use of Caffeine: denies use    Family Psychiatric History:   Psychiatric Illness Mother  Hospitalization: unspecified and Father Hospitalization: unspecified    Social History  Education: high school diploma/GED  Learning Disabilities: None  Marital history:   Living arrangement, social support: The patient lives in home with wife  Occupational History: employed  Functioning Relationships: good support system    Other Pertinent History: None    Traumatic History:   Abuse: None  Other Traumatic Events: None    Past Medical History:   Diagnosis Date    Allergic     Hypertension        Medical Review Of Systems:  Review of Systems    Meds/Allergies   all current active meds have been reviewed  Allergies   Allergen Reactions    Pollen Extract        Objective   Vital signs in last 24 hours:  HR:  [125] 125  Resp:  [18] 18  BP: (169)/(104) 169/104    No intake or output data in the 24 hours ending 07/12/22 2232    Mental Status Evaluation:  Appearance:  age appropriate   Behavior:  normal   Speech:  normal pitch and normal volume   Mood:  anxious   Affect:  normal and anxious   Language: naming objects   Thought Process:  normal   Thought Content:  normal   Perceptual Disturbances: None   Risk Potential: Potential for Aggression No   Sensorium:  person, place and time/date   Cognition:  recent and remote memory grossly intact   Consciousness:  alert    Attention: attention span and concentration were age appropriate   Intellect: within normal limits   Fund of Knowledge: awareness of current events: President   Insight:  fair   Judgment: fair   Muscle Strength and Tone: NFT   Gait/Station: NFT   Motor Activity: no abnormal movements     Lab Results: Reviewed  Imaging Studies: Reviewed   EKG, Pathology, and Other Studies: Reviewed    Code Status: No Order  Advance Directive and Living Will:      Power of :    POLST:      Counseling / Coordination of Care  Total floor / unit time spent today 30  minutes  Greater than 50% of total time was spent with the patient and / or family counseling and / or coordination of care  A description of the counseling / coordination of care: Direct Patient Care, Chart Review, and Documentation

## 2022-07-13 NOTE — ED PROVIDER NOTES
History  Chief Complaint   Patient presents with    Psychiatric Evaluation     Patient is a 22-year-old male who presents for psychiatric evaluation  Patient is here with his wife  Patient has been under a lot of stress anxiety discuss he is afraid that he gave his father and brother Loida   The patient tested for COVID on 07/06  The patients wife says the patient has been pacing around and has not been able to let go of the fact that he gave his family COVID  He is afraid that he killed his father, and his brother that he is going to die from it    According to the patient's wife, he made a comment last night that he was going to shoot himself   The patient is adamant that he did not need this and that he would not act on this statement  He denies any thoughts of SI or HI at this time  He denies a history of trying to hurt himself or any other psychiatric disease  Patient admits to a persistent cough but denies any chest pain, shortness of breath, lightheadedness, dizziness, abdominal pain  Prior to Admission Medications   Prescriptions Last Dose Informant Patient Reported? Taking? B-D ALLERGY SYRINGE 1CC/28G 28G X 1/2" 1 ML MISC   Yes Yes   Sig: 3 SYRINGES PER WEEK FOR ALLERGY INJECTIONS   Cholecalciferol (Vitamin D) 125 MCG (5000 UT) CAPS   Yes Yes   Sig: Take 5,000 Units by mouth in the morning   EPINEPHrine (EPIPEN) 0 3 mg/0 3 mL SOAJ   Yes Yes   Sig: Inject 0 3 mg into a muscle   Syringe/Needle, Disp, 28G X 1/2" 1 ML MISC   Yes No   Sig: 3 syringes per week for allergy injections   atorvastatin (LIPITOR) 10 mg tablet   No Yes   Sig: Take 1 tablet (10 mg total) by mouth daily   fexofenadine (ALLEGRA) 180 MG tablet   Yes Yes   Sig: TAKE 1 TABLET (180 MG TOTAL) BY MOUTH DAILY     lisinopril (ZESTRIL) 20 mg tablet   No Yes   Sig: Take 1 tablet (20 mg total) by mouth 2 (two) times a day   montelukast (SINGULAIR) 10 mg tablet   No Yes   Sig: TAKE 1 TABLET BY MOUTH DAILY AT BEDTIME Facility-Administered Medications: None       Past Medical History:   Diagnosis Date    Allergic     Hypertension        Past Surgical History:   Procedure Laterality Date    NOSE SURGERY      WISDOM TOOTH EXTRACTION         Family History   Problem Relation Age of Onset    Depression Mother     Depression Father     Hypertension Family      I have reviewed and agree with the history as documented  E-Cigarette/Vaping    E-Cigarette Use Never User      E-Cigarette/Vaping Substances    Nicotine No     THC No     CBD No     Flavoring No     Other No     Unknown No      Social History     Tobacco Use    Smoking status: Never Smoker    Smokeless tobacco: Never Used   Vaping Use    Vaping Use: Never used   Substance Use Topics    Alcohol use: Not Currently    Drug use: Never       Review of Systems   Constitutional: Negative for chills, fever and unexpected weight change  HENT: Negative for congestion, sore throat and trouble swallowing  Eyes: Negative for pain, discharge and itching  Respiratory: Negative for cough, chest tightness, shortness of breath and wheezing  Cardiovascular: Negative for chest pain, palpitations and leg swelling  Gastrointestinal: Negative for abdominal pain, blood in stool, diarrhea, nausea and vomiting  Endocrine: Negative for polyuria  Genitourinary: Negative for difficulty urinating, dysuria, frequency and hematuria  Musculoskeletal: Negative for arthralgias and back pain  Neurological: Negative for dizziness, syncope, weakness, light-headedness and headaches  Psychiatric/Behavioral: Negative for suicidal ideas  The patient is nervous/anxious  Physical Exam  Physical Exam  Vitals and nursing note reviewed  Constitutional:       General: He is not in acute distress  Appearance: He is well-developed  HENT:      Head: Normocephalic and atraumatic        Right Ear: External ear normal       Left Ear: External ear normal    Eyes: Conjunctiva/sclera: Conjunctivae normal       Pupils: Pupils are equal, round, and reactive to light  Cardiovascular:      Rate and Rhythm: Normal rate and regular rhythm  Heart sounds: Normal heart sounds  No murmur heard  No friction rub  No gallop  Pulmonary:      Effort: Pulmonary effort is normal  No respiratory distress  Breath sounds: Normal breath sounds  No wheezing or rales  Abdominal:      General: Bowel sounds are normal  There is no distension  Palpations: Abdomen is soft  Tenderness: There is no abdominal tenderness  There is no guarding  Musculoskeletal:         General: No tenderness or deformity  Normal range of motion  Cervical back: Normal range of motion  Lymphadenopathy:      Cervical: No cervical adenopathy  Skin:     General: Skin is warm and dry  Neurological:      General: No focal deficit present  Mental Status: He is alert and oriented to person, place, and time  Mental status is at baseline  Cranial Nerves: No cranial nerve deficit  Sensory: No sensory deficit  Motor: No weakness or abnormal muscle tone  Psychiatric:         Attention and Perception: Attention normal          Mood and Affect: Mood normal          Speech: Speech normal          Behavior: Behavior normal          Thought Content: Thought content normal  Thought content does not include suicidal ideation  Thought content does not include suicidal plan           Vital Signs  ED Triage Vitals   Temperature Pulse Respirations Blood Pressure SpO2   07/12/22 2255 07/12/22 2047 07/12/22 2047 07/12/22 2047 07/12/22 2047   98 °F (36 7 °C) (!) 125 18 (!) 169/104 97 %      Temp src Heart Rate Source Patient Position - Orthostatic VS BP Location FiO2 (%)   -- -- -- -- --             Pain Score       07/12/22 2047       No Pain           Vitals:    07/12/22 2047 07/12/22 2255   BP: (!) 169/104 149/85   Pulse: (!) 125 71         Visual Acuity      ED Medications  Medications LORazepam (ATIVAN) tablet 1 mg (1 mg Oral Given 7/12/22 2158)   escitalopram (LEXAPRO) tablet 5 mg (5 mg Oral Given 7/12/22 2258)       Diagnostic Studies  Results Reviewed     Procedure Component Value Units Date/Time    Rapid drug screen, urine [[id number]]  (Normal) Collected: 07/12/22 2200    Lab Status: Final result Specimen: Urine, Clean Catch Updated: 07/12/22 2215     Amph/Meth UR Negative     Barbiturate Ur Negative     Benzodiazepine Urine Negative     Cocaine Urine Negative     Methadone Urine Negative     Opiate Urine Negative     PCP Ur Negative     THC Urine Negative     Oxycodone Urine Negative    Narrative:      FOR MEDICAL PURPOSES ONLY  IF CONFIRMATION NEEDED PLEASE CONTACT THE LAB WITHIN 5 DAYS  Drug Screen Cutoff Levels:  AMPHETAMINE/METHAMPHETAMINES  1000 ng/mL  BARBITURATES     200 ng/mL  BENZODIAZEPINES     200 ng/mL  COCAINE      300 ng/mL  METHADONE      300 ng/mL  OPIATES      300 ng/mL  PHENCYCLIDINE     25 ng/mL  THC       50 ng/mL  OXYCODONE      100 ng/mL    POCT alcohol breath test [[id number]]  (Normal) Resulted: 07/12/22 2101    Lab Status: Final result Updated: 07/12/22 2101     EXTBreath Alcohol 0 00                 No orders to display              Procedures  Procedures         ED Course                               SBIRT 20yo+    Flowsheet Row Most Recent Value   SBIRT (25 yo +)    In order to provide better care to our patients, we are screening all of our patients for alcohol and drug use  Would it be okay to ask you these screening questions? Yes Filed at: 07/12/2022 2110   Initial Alcohol Screen: US AUDIT-C     1  How often do you have a drink containing alcohol? 0 Filed at: 07/12/2022 2110   2  How many drinks containing alcohol do you have on a typical day you are drinking? 0 Filed at: 07/12/2022 2110   3a  Male UNDER 65: How often do you have five or more drinks on one occasion? 0 Filed at: 07/12/2022 2110   3b  FEMALE Any Age, or MALE 65+:  How often do you have 4 or more drinks on one occassion? 0 Filed at: 07/12/2022 2110   Audit-C Score 0 Filed at: 07/12/2022 2110   JOSH: How many times in the past year have you    Used an illegal drug or used a prescription medication for non-medical reasons? Never Filed at: 07/12/2022 2110                    MDM  Number of Diagnoses or Management Options  Anxiety  Diagnosis management comments: 49 yo M presenting for anxiety and psych evaluation  Has been extremely stressed since getting Covid  Afraid " that he gave it to his father and brother and that he killed them "  Wife says that he me a, about wanting to shoot himself last night  The patient said that he said this in the moment that he would not act on it  He denies any SI or HI at this current time  Crisis evaluated the patient  Psych consult placed  Patient does not meet 302 criteria at this time  Disposition  Final diagnoses:   Anxiety     Time reflects when diagnosis was documented in both MDM as applicable and the Disposition within this note     Time User Action Codes Description Comment    7/12/2022  9:32 PM Vinicio Pollock [F41 9] Anxiety       ED Disposition     ED Disposition   Discharge    Condition   Stable    Date/Time   Tue Jul 12, 2022 10:59 PM    Comment   Karey Fernando discharge to home/self care                 Follow-up Information     Follow up With Specialties Details Why [address], [address] [facility], Nurse Practitioner, Emergency Medicine Call today  [address]  [phone]      Psychiatry  Call today      Evelyn Tyler MD Psychiatry, [facility] Call today  [address] [facility] [address] [facility]  [phone]            Discharge Medication List as of 7/12/2022 11:03 PM      START taking these medications    Details   escitalopram (LEXAPRO) 5 mg tablet Multiple Dosages:Starting Tue 7/12/2022, Until Mon 7/18/2022 at 2359, THEN Starting Tue 7/19/2022, Until Wed 8/17/2022 at 2359Take 1 tablet (5 mg total) by mouth daily for 7 days, THEN 2 tablets (10 mg total) daily  , Normal      gabapentin (Neurontin) 300 mg capsule Take 1 capsule (300 mg total) by mouth 2 (two) times a day as needed (anxiety) For post-herpetic neuralgia: Take 1 tablet on day 1,  Then take 2 tablets on day 2, Then take 3 tablets on day 3 and every day after that as instructed by your doctor , Wen solorio Tue 7/12/2022, Normal         CONTINUE these medications which have NOT CHANGED    Details   atorvastatin (LIPITOR) 10 mg tablet Take 1 tablet (10 mg total) by mouth daily, Starting Tue 2/22/2022, Normal      B-D ALLERGY SYRINGE 1CC/28G 28G X 1/2" 1 ML MISC 3 SYRINGES PER WEEK FOR ALLERGY INJECTIONS, Historical Med      Cholecalciferol (Vitamin D) 125 MCG (5000 UT) CAPS Take 5,000 Units by mouth in the morning, Historical Med      EPINEPHrine (EPIPEN) 0 3 mg/0 3 mL SOAJ Inject 0 3 mg into a muscle, Starting Mon 4/22/2019, Historical Med      fexofenadine (ALLEGRA) 180 MG tablet TAKE 1 TABLET (180 MG TOTAL) BY MOUTH DAILY  , Historical Med      lisinopril (ZESTRIL) 20 mg tablet Take 1 tablet (20 mg total) by mouth 2 (two) times a day, Starting Mon 2/14/2022, Normal      montelukast (SINGULAIR) 10 mg tablet TAKE 1 TABLET BY MOUTH DAILY AT BEDTIME, Normal      Syringe/Needle, Disp, 28G X 1/2" 1 ML MISC 3 syringes per week for allergy injections, Historical Med      fluticasone (Flonase Sensimist) 27 5 MCG/SPRAY nasal spray 2 sprays into each nostril daily, Starting Tue 5/18/2021, Until Wed 5/18/2022, Historical Med      fluticasone (FLONASE) 50 mcg/act nasal spray 1 spray into each nostril 2 (two) times a day as needed for rhinitis, Starting Wed 5/12/2021, Until Mon 2/14/2022 at 2359, Normal             No discharge procedures on file      PDMP Review     None          ED Provider  Electronically Signed by           Ginna Smith DO  07/18/22 2797

## 2022-07-13 NOTE — ED NOTES
Pt presents to emergency department due to anxiety, depression  Pt appeared calm and cooperative  Pt oriented x4  Pt presents with good eye contact  Pt accompanied by his wife  Pt currently working  Pt has no psychiatric history  Pt not on any medication  Pt states he tested positive for Covid on 7/6/2022 and since last Friday he began experiencing anxiety and depression  Pt states he was worried about his mother and father who he did see that they may get Covid as well  Pt's wife reports that pt's mother was angry at the pt due to this fact that he got Covid  Pt expressed his anxiety as worrying about his parents, not being fully able to focus at work  Pt also reported feeling depressed but could not elaborate on it  Pt states since Friday he has once suicidal thoughts but more like it was his fault that possibly his parents may get Covid  Pt denies any intentions of hurting himself, no plan, no previous SI or attempts  Pt denies any homicidal thoughts, intentions or plans  Pt denies self harming but wife states he slapped himself on face a couple of times, blaming self  No hallucinations reported  It seems as pt may be slightly paranoid as he states he assumes that his parents subtracted Covid from him already  Pt's wife very firm on inpatient hospitalization but pt worries about being stuck in emergency department for days and worrying even more  Crisis informed pt about treatment options  Pt asked to speak with psychiatrist to see if possibly medication could be recommended to help  Crisis spoke with attending Rosa De Los Santos who placed psych consult  No other stressors, trauma or events were reported  No drug use

## 2022-07-13 NOTE — ED CARE HANDOFF
Emergency Department Sign Out Note        Sign out and transfer of care from Dr Francesca Piña  See Separate Emergency Department note  The patient, Javier Conway, was evaluated by the previous provider for anxiety  Workup Completed:  UDS, ETOH    ED Course / Workup Pending (followup):                                      ED Course as of 07/12/22 2314   Tue Jul 12, 2022   2135 Received in sign out:     Pt is here for anxiety  Made a passive comment to wife he was going to shoot himself  Pt states he said this in passing  He states that he is not suicidal    Plan:   Psych consulted  Will see they can see him tonight and recommend in patient or not    Medically cleared:   uds, ETOH done  Pt has mild cough  Covid (+) 6 days ago    Pt can go home if he wants and if wife does not want to petition [phone] - she was offered this option, as there are no present indications to 302 by the ED staff   [id number] Pt stable  Understands plan of dc  Pt and wife are happy w/ plan      Procedures  MDM        Disposition  Final diagnoses:   Anxiety     Time reflects when diagnosis was documented in both MDM as applicable and the Disposition within this note     Time User Action Codes Description Comment    7/12/2022  9:32 PM Danny Adhikari Add [F41 9] Anxiety       ED Disposition     ED Disposition   Discharge    Condition   Stable    Date/Time   Tue Jul 12, 2022 10:59 PM    Comment   Jair Fernando discharge to home/self care                 Follow-up Information     Follow up With Specialties Details Why [address], [address] [facility], Nurse Practitioner, Emergency Medicine Call today  [employer] [employer] [address]  [phone]      Psychiatry  Call today      Gala Pacheco MD Psychiatry, [facility] Call today  [address] [facility] [address] [place]  [phone]          Discharge Medication List as of 7/12/2022 11:03 PM      START taking these medications    Details escitalopram (LEXAPRO) 5 mg tablet Multiple Dosages:Starting Tue 7/12/2022, Until Mon 7/18/2022 at 2359, THEN Starting Tue 7/19/2022, Until Wed 8/17/2022 at 2359Take 1 tablet (5 mg total) by mouth daily for 7 days, THEN 2 tablets (10 mg total) daily  , Normal      gabapentin (Neurontin) 300 mg capsule Take 1 capsule (300 mg total) by mouth 2 (two) times a day as needed (anxiety) For post-herpetic neuralgia: Take 1 tablet on day 1,  Then take 2 tablets on day 2, Then take 3 tablets on day 3 and every day after that as instructed by your doctor , Wen solorio Tue 7/12/2022, Normal         CONTINUE these medications which have NOT CHANGED    Details   atorvastatin (LIPITOR) 10 mg tablet Take 1 tablet (10 mg total) by mouth daily, Starting Tue 2/22/2022, Normal      B-D ALLERGY SYRINGE 1CC/28G 28G X 1/2" 1 ML MISC 3 SYRINGES PER WEEK FOR ALLERGY INJECTIONS, Historical Med      Cholecalciferol (Vitamin D) 125 MCG (5000 UT) CAPS Take 5,000 Units by mouth in the morning, Historical Med      EPINEPHrine (EPIPEN) 0 3 mg/0 3 mL SOAJ Inject 0 3 mg into a muscle, Starting Mon 4/22/2019, Historical Med      fexofenadine (ALLEGRA) 180 MG tablet TAKE 1 TABLET (180 MG TOTAL) BY MOUTH DAILY  , Historical Med      lisinopril (ZESTRIL) 20 mg tablet Take 1 tablet (20 mg total) by mouth 2 (two) times a day, Starting Mon 2/14/2022, Normal      montelukast (SINGULAIR) 10 mg tablet TAKE 1 TABLET BY MOUTH DAILY AT BEDTIME, Normal      fluticasone (Flonase Sensimist) 27 5 MCG/SPRAY nasal spray 2 sprays into each nostril daily, Starting Tue 5/18/2021, Until Wed 5/18/2022, Historical Med      fluticasone (FLONASE) 50 mcg/act nasal spray 1 spray into each nostril 2 (two) times a day as needed for rhinitis, Starting Wed 5/12/2021, Until Mon 2/14/2022 at 2359, Normal      Syringe/Needle, Disp, 28G X 1/2" 1 ML MISC 3 syringes per week for allergy injections, Historical Med           No discharge procedures on file         ED Provider  Electronically Signed by     Celeste Whitaker MD  07/12/22 0760

## 2022-07-13 NOTE — DISCHARGE INSTRUCTIONS
RETURN IF WORSE IN ANY WAY:   WORSENING symptoms,   CHEST PAIN OR SHORTNESS OF BREATH,   FEVER OR FLU LIKE SYMPTOMS,   OR NEW AND CONCERNING SYMPTOMS SIGNS OR SYMPTOMS      PLEASE CALL YOUR PRIMARY DOCTOR IN THE MORNING TO SET UP FOLLOW UP   PLEASE REVIEW THE WORK UP RESULTS WITH YOUR DOCTOR

## 2022-07-13 NOTE — ED NOTES
Pt provided with outpatient resources, Pt encouraged to schedule all appointments tomorrow  Pt asked to come back if situation gets changes  Pt appears to be calm

## 2022-07-15 ENCOUNTER — OFFICE VISIT (OUTPATIENT)
Dept: FAMILY MEDICINE CLINIC | Facility: CLINIC | Age: 47
End: 2022-07-15
Payer: COMMERCIAL

## 2022-07-15 VITALS
WEIGHT: 165.2 LBS | RESPIRATION RATE: 16 BRPM | TEMPERATURE: 97.2 F | OXYGEN SATURATION: 98 % | HEART RATE: 81 BPM | DIASTOLIC BLOOD PRESSURE: 70 MMHG | HEIGHT: 69 IN | BODY MASS INDEX: 24.47 KG/M2 | SYSTOLIC BLOOD PRESSURE: 120 MMHG

## 2022-07-15 DIAGNOSIS — K59.00 CONSTIPATION, UNSPECIFIED CONSTIPATION TYPE: ICD-10-CM

## 2022-07-15 DIAGNOSIS — F41.9 ANXIETY: Primary | ICD-10-CM

## 2022-07-15 DIAGNOSIS — R05.9 COUGH: ICD-10-CM

## 2022-07-15 DIAGNOSIS — U07.1 COVID-19: ICD-10-CM

## 2022-07-15 PROCEDURE — 99214 OFFICE O/P EST MOD 30 MIN: CPT | Performed by: FAMILY MEDICINE

## 2022-07-15 PROCEDURE — 3725F SCREEN DEPRESSION PERFORMED: CPT | Performed by: FAMILY MEDICINE

## 2022-07-15 RX ORDER — ESCITALOPRAM OXALATE 10 MG/1
10 TABLET ORAL DAILY
Qty: 30 TABLET | Refills: 1 | Status: SHIPPED | OUTPATIENT
Start: 2022-07-15 | End: 2022-07-20

## 2022-07-15 RX ORDER — HYDROXYZINE 50 MG/1
50 TABLET, FILM COATED ORAL 3 TIMES DAILY PRN
Qty: 30 TABLET | Refills: 0 | Status: SHIPPED | OUTPATIENT
Start: 2022-07-15

## 2022-07-15 NOTE — PROGRESS NOTES
Assessment/Plan:       Problem List Items Addressed This Visit        Other    Anxiety - Primary     - Continue Lexapro, increase to 10 mg daily   - Stop gabapentin   - Can take Atarax at night for sleep, discussed drowsiness side effect/not to take during day/while working   - Will check TSH         Relevant Medications    escitalopram (LEXAPRO) 10 mg tablet    hydrOXYzine HCL (ATARAX) 50 mg tablet    Other Relevant Orders    TSH, 3rd generation with Free T4 reflex    Constipation     - Hopefully will improve with stopping gabapentin   - Discussed hydration and fiber in diet          COVID-19    Cough     - Lungs clear, discussed if persistent will consider chest XR                  Subjective:      Patient ID: Raghav Larry is a 48 y o  male  HPI     Anxiety- Seen in ED 7/12 for anxiety, discharged with Lexapro and gabapentin  Anxiety better  He reports with the medications, he has noticed constipation, gabapentin doesn't seem to be helping much except with sleep at night, concerned about drowsiness with work  He is set up to start counseling as well  COVID- Symptoms started around 7/6, GI symptoms which are better, no coughing initially but starting yesterday with dry cough, no shortness of breath  Mouth has been dry, no chest pains  No nasal congestion or post-nasal drip  The following portions of the patient's history were reviewed and updated as appropriate: allergies, current medications, past family history, past medical history, past social history, past surgical history, and problem list     Review of Systems   All other systems reviewed and are negative  Objective:      /70   Pulse 81   Temp (!) 97 2 °F (36 2 °C) (Tympanic)   Resp 16   Ht 5' 9" (1 753 m)   Wt 74 9 kg (165 lb 3 2 oz)   SpO2 98%   BMI 24 40 kg/m²          Physical Exam  Vitals reviewed  Constitutional:       General: He is not in acute distress  Appearance: Normal appearance   He is not ill-appearing, toxic-appearing or diaphoretic  HENT:      Head: Normocephalic and atraumatic  Eyes:      General:         Right eye: No discharge  Left eye: No discharge  Extraocular Movements: Extraocular movements intact  Conjunctiva/sclera: Conjunctivae normal    Cardiovascular:      Rate and Rhythm: Normal rate and regular rhythm  Heart sounds: Normal heart sounds  No murmur heard  No friction rub  No gallop  Pulmonary:      Effort: Pulmonary effort is normal  No respiratory distress  Breath sounds: Normal breath sounds  No stridor  No wheezing or rhonchi  Musculoskeletal:         General: No swelling, tenderness or signs of injury  Right lower leg: No edema  Left lower leg: No edema  Skin:     General: Skin is warm  Coloration: Skin is not pale  Findings: No erythema or rash  Neurological:      Mental Status: He is alert and oriented to person, place, and time  Motor: No weakness     Psychiatric:         Mood and Affect: Mood normal          Behavior: Behavior normal              DO [facility]

## 2022-07-15 NOTE — ASSESSMENT & PLAN NOTE
- Continue Lexapro, increase to 10 mg daily   - Stop gabapentin   - Can take Atarax at night for sleep, discussed drowsiness side effect/not to take during day/while working   - Will check TSH

## 2022-07-23 ENCOUNTER — APPOINTMENT (OUTPATIENT)
Dept: LAB | Facility: CLINIC | Age: 47
End: 2022-07-23
Payer: COMMERCIAL

## 2022-07-23 DIAGNOSIS — I10 HTN (HYPERTENSION), BENIGN: ICD-10-CM

## 2022-07-23 DIAGNOSIS — E78.1 HYPERTRIGLYCERIDEMIA: ICD-10-CM

## 2022-07-23 DIAGNOSIS — F41.9 ANXIETY: ICD-10-CM

## 2022-07-23 DIAGNOSIS — Z13.1 SCREENING FOR DIABETES MELLITUS: ICD-10-CM

## 2022-07-23 LAB
ALBUMIN SERPL BCP-MCNC: 3.5 G/DL (ref 3.5–5)
ALP SERPL-CCNC: 51 U/L (ref 46–116)
ALT SERPL W P-5'-P-CCNC: 59 U/L (ref 12–78)
ANION GAP SERPL CALCULATED.3IONS-SCNC: 5 MMOL/L (ref 4–13)
AST SERPL W P-5'-P-CCNC: 23 U/L (ref 5–45)
BASOPHILS # BLD AUTO: 0.02 THOUSANDS/ΜL (ref 0–0.1)
BASOPHILS NFR BLD AUTO: 0 % (ref 0–1)
BILIRUB SERPL-MCNC: 0.41 MG/DL (ref 0.2–1)
BUN SERPL-MCNC: 16 MG/DL (ref 5–25)
CALCIUM SERPL-MCNC: 8.9 MG/DL (ref 8.3–10.1)
CHLORIDE SERPL-SCNC: 107 MMOL/L (ref 96–108)
CHOLEST SERPL-MCNC: 136 MG/DL
CO2 SERPL-SCNC: 27 MMOL/L (ref 21–32)
CREAT SERPL-MCNC: 1.03 MG/DL (ref 0.6–1.3)
EOSINOPHIL # BLD AUTO: 0.05 THOUSAND/ΜL (ref 0–0.61)
EOSINOPHIL NFR BLD AUTO: 1 % (ref 0–6)
ERYTHROCYTE [DISTWIDTH] IN BLOOD BY AUTOMATED COUNT: 11.9 % (ref 11.6–15.1)
EST. AVERAGE GLUCOSE BLD GHB EST-MCNC: 123 MG/DL
GFR SERPL CREATININE-BSD FRML MDRD: 85 ML/MIN/1.73SQ M
GLUCOSE P FAST SERPL-MCNC: 97 MG/DL (ref 65–99)
HBA1C MFR BLD: 5.9 %
HCT VFR BLD AUTO: 40.5 % (ref 36.5–49.3)
HDLC SERPL-MCNC: 38 MG/DL
HGB BLD-MCNC: 13.5 G/DL (ref 12–17)
IMM GRANULOCYTES # BLD AUTO: 0.02 THOUSAND/UL (ref 0–0.2)
IMM GRANULOCYTES NFR BLD AUTO: 0 % (ref 0–2)
LDLC SERPL CALC-MCNC: 71 MG/DL (ref 0–100)
LYMPHOCYTES # BLD AUTO: 1.2 THOUSANDS/ΜL (ref 0.6–4.47)
LYMPHOCYTES NFR BLD AUTO: 25 % (ref 14–44)
MCH RBC QN AUTO: 29.1 PG (ref 26.8–34.3)
MCHC RBC AUTO-ENTMCNC: 33.3 G/DL (ref 31.4–37.4)
MCV RBC AUTO: 87 FL (ref 82–98)
MONOCYTES # BLD AUTO: 0.45 THOUSAND/ΜL (ref 0.17–1.22)
MONOCYTES NFR BLD AUTO: 9 % (ref 4–12)
NEUTROPHILS # BLD AUTO: 3.08 THOUSANDS/ΜL (ref 1.85–7.62)
NEUTS SEG NFR BLD AUTO: 65 % (ref 43–75)
NONHDLC SERPL-MCNC: 98 MG/DL
NRBC BLD AUTO-RTO: 0 /100 WBCS
PLATELET # BLD AUTO: 204 THOUSANDS/UL (ref 149–390)
PMV BLD AUTO: 11.5 FL (ref 8.9–12.7)
POTASSIUM SERPL-SCNC: 4.3 MMOL/L (ref 3.5–5.3)
PROT SERPL-MCNC: 6.7 G/DL (ref 6.4–8.4)
RBC # BLD AUTO: 4.64 MILLION/UL (ref 3.88–5.62)
SODIUM SERPL-SCNC: 139 MMOL/L (ref 135–147)
TRIGL SERPL-MCNC: 134 MG/DL
TSH SERPL DL<=0.05 MIU/L-ACNC: 1.42 UIU/ML (ref 0.45–4.5)
WBC # BLD AUTO: 4.82 THOUSAND/UL (ref 4.31–10.16)

## 2022-07-23 PROCEDURE — 83036 HEMOGLOBIN GLYCOSYLATED A1C: CPT

## 2022-07-23 PROCEDURE — 36415 COLL VENOUS BLD VENIPUNCTURE: CPT

## 2022-07-23 PROCEDURE — 84443 ASSAY THYROID STIM HORMONE: CPT

## 2022-07-23 PROCEDURE — 85025 COMPLETE CBC W/AUTO DIFF WBC: CPT

## 2022-07-23 PROCEDURE — 80061 LIPID PANEL: CPT

## 2022-07-23 PROCEDURE — 80053 COMPREHEN METABOLIC PANEL: CPT

## 2022-08-03 ENCOUNTER — CONSULT (OUTPATIENT)
Dept: NEUROLOGY | Facility: CLINIC | Age: 47
End: 2022-08-03
Payer: COMMERCIAL

## 2022-08-03 VITALS
DIASTOLIC BLOOD PRESSURE: 80 MMHG | HEART RATE: 68 BPM | BODY MASS INDEX: 24.44 KG/M2 | WEIGHT: 165 LBS | HEIGHT: 69 IN | SYSTOLIC BLOOD PRESSURE: 120 MMHG

## 2022-08-03 DIAGNOSIS — R42 DIZZINESS: ICD-10-CM

## 2022-08-03 DIAGNOSIS — G43.709 CHRONIC MIGRAINE WITHOUT AURA: Primary | ICD-10-CM

## 2022-08-03 PROCEDURE — 99245 OFF/OP CONSLTJ NEW/EST HI 55: CPT | Performed by: PSYCHIATRY & NEUROLOGY

## 2022-08-03 RX ORDER — SUMATRIPTAN 100 MG/1
100 TABLET, FILM COATED ORAL ONCE AS NEEDED
Qty: 9 TABLET | Refills: 2 | Status: SHIPPED | OUTPATIENT
Start: 2022-08-03 | End: 2022-10-10 | Stop reason: SDUPTHER

## 2022-08-03 NOTE — PROGRESS NOTES
Patient ID: Leonardo Edgar is a 48 y o  male  Assessment/Plan: This is a 47 y/o M who is here as a new patient to establish care with us for chronic migraines  PLAN:      Diagnoses and all orders for this visit:    Chronic migraine without aura  Treatment -      Prophylactic tx - start magnesium, B2, and feverfew  Previous medications she has tried/failed - none  Abortive medications - start patient with Imitrex  Obtain MRI brain  Botox - Recommended botox injections every 3 months, discussed side effects from botox injections such as injection site swelling, ptosis and neck stiffness/pain and worsening headaches especially with the first treatment  Patient does not want to pursue at this time  Lifestyle Recommendations:  - Maintain headache diary  We discussed an DIANA for a smart phone is "Migraine buddy"  - When patient has a moderate to severe headache, they should seek rest, initiate relaxation and apply cold compresses to the head  Hydration - Please drink at least 64 ounces of water a day to help remain hydrated  sleep -  Maintain regular sleep schedule  Adults need at least 7-8 hours of uninterrupted a night  Maintain good sleep hygiene:Adults need at least 7-8 hours of uninterrupted a night  Diet - Patient is to have regular frequent meals to prevent headache onset  Avoid dietary trigger  (aged cheese, peanuts, MSG, aspartame and nitrates)  Medications - Limit over the counter medications such as Tylenol, Ibuprofen, Aleve, Excedrin  (No more than 3 times a week)  Exercise - daily exercise is not only good for your heart but also good for your mental health  Recommend 4-5 times a week for 20 minutes  -     MRI brain without contrast; Future  -     Riboflavin-Magnesium-Feverfew 200-180-50 MG TABS; Take 200 mg by mouth in the morning  -     SUMAtriptan (Imitrex) 100 mg tablet;  Take 1 tablet (100 mg total) by mouth once as needed for migraine for up to 1 dose    Dizziness  - Ambulatory Referral to Neurology       Follow up -3 months    I would be happy to see the patient sooner if any new questions/concerns arise  Patient/Guardian was advised to the call the office if they have any questions and concerns in the meantime  Patient/Guardian does understand that if they have any new stroke like symptoms such as facial droop on one side, weakness/paralysis on either side, speech trouble, numbness on one side, balance issues, any vision changes, extreme dizziness or any new headache, to call 9-1-1 immediately or to proceed to the nearest ER immediately  Subjective:    HPI    57-year-old male who is here as a new patient for evaluation of chronic headaches  Patient states that migraines started at the age of 20  He is having about 2-3 headaches a month  They last 1-2 hours sometimes longer  On a scale of 1-10 they are about a 3/4  They are located bifrontally  Description of pain is throbbing/pressure  Associated symptoms the headaches are sore/stiff neck  Does have ringing in ears and tingling or numbness in hands and feet  Stress and related to sleep or other triggers for headaches  Acute headaches for headache are Advil Aleve Tylenol  He has not been on any preventative medications  He states that he does not drink any coffee and his sleep is good  The following portions of the patient's history were reviewed and updated as appropriate:   He  has a past medical history of Allergic and Hypertension    He   Patient Active Problem List    Diagnosis Date Noted    Chronic migraine without aura 08/03/2022    Anxiety 07/15/2022    Constipation 07/15/2022    COVID-19 07/15/2022    Cough 07/15/2022    Overweight with body mass index (BMI) of 26 to 26 9 in adult 01/11/2021    Hypertriglyceridemia 04/06/2020    Overweight (BMI 25 0-29 9) 09/19/2019    HTN (hypertension), benign 09/19/2019     He  has a past surgical history that includes Nose surgery and [place] tooth extraction  His family history includes Depression in his father and mother; Hypertension in his family  He  reports that he has never smoked  He has never used smokeless tobacco  He reports previous alcohol use  He reports that he does not use drugs  Current Outpatient Medications   Medication Sig Dispense Refill    atorvastatin (LIPITOR) 10 mg tablet Take 1 tablet (10 mg total) by mouth daily 90 tablet 3    B-D ALLERGY SYRINGE 1CC/28G 28G X 1/2" 1 ML MISC 3 SYRINGES PER WEEK FOR ALLERGY INJECTIONS      Cholecalciferol (Vitamin D) 125 MCG (5000 UT) CAPS Take 5,000 Units by mouth in the morning      EPINEPHrine (EPIPEN) 0 3 mg/0 3 mL SOAJ Inject 0 3 mg into a muscle      fexofenadine (ALLEGRA) 180 MG tablet TAKE 1 TABLET (180 MG TOTAL) BY MOUTH DAILY   hydrOXYzine HCL (ATARAX) 50 mg tablet Take 1 tablet (50 mg total) by mouth 3 (three) times a day as needed for anxiety 30 tablet 0    lisinopril (ZESTRIL) 20 mg tablet Take 1 tablet (20 mg total) by mouth 2 (two) times a day 180 tablet 1    montelukast (SINGULAIR) 10 mg tablet TAKE 1 TABLET BY MOUTH DAILY AT BEDTIME 90 tablet 1    Riboflavin-Magnesium-Feverfew 200-180-50 MG TABS Take 200 mg by mouth in the morning 30 tablet 3    SUMAtriptan (Imitrex) 100 mg tablet Take 1 tablet (100 mg total) by mouth once as needed for migraine for up to 1 dose 9 tablet 2    Syringe/Needle, Disp, 28G X 1/2" 1 ML MISC 3 syringes per week for allergy injections       No current facility-administered medications for this visit       Current Outpatient Medications on File Prior to Visit   Medication Sig    atorvastatin (LIPITOR) 10 mg tablet Take 1 tablet (10 mg total) by mouth daily    B-D ALLERGY SYRINGE 1CC/28G 28G X 1/2" 1 ML MISC 3 SYRINGES PER WEEK FOR ALLERGY INJECTIONS    Cholecalciferol (Vitamin D) 125 MCG (5000 UT) CAPS Take 5,000 Units by mouth in the morning    EPINEPHrine (EPIPEN) 0 3 mg/0 3 mL SOAJ Inject 0 3 mg into a muscle    fexofenadine (ALLEGRA) 180 MG tablet TAKE 1 TABLET (180 MG TOTAL) BY MOUTH DAILY   hydrOXYzine HCL (ATARAX) 50 mg tablet Take 1 tablet (50 mg total) by mouth 3 (three) times a day as needed for anxiety    lisinopril (ZESTRIL) 20 mg tablet Take 1 tablet (20 mg total) by mouth 2 (two) times a day    montelukast (SINGULAIR) 10 mg tablet TAKE 1 TABLET BY MOUTH DAILY AT BEDTIME    Syringe/Needle, Disp, 28G X 1/2" 1 ML MISC 3 syringes per week for allergy injections     No current facility-administered medications on file prior to visit  He is allergic to pollen extract            Objective:    Blood pressure 120/80, pulse 68, height 5' 9" (1 753 m), weight 74 8 kg (165 lb)  Physical Exam  General: Patient is not in any acute/apparent distress, well nourished, well developed and cooperative  Neurologic Examination:   Mental status: alert, awake, oriented X 3 and following commands  Speech/Language: Speech is fluent without any dysarthria, no aphasia noted, can name, repeat, read and write and comprehension intact    Cranial Nerves:   CN I: smell not tested  CN II: Visual fields full to confrontation, fundus - no papilledema noted  CN III, IV, VI: Extraocular movements intact bilaterally  Pupils equal round and reactive to light bilaterally  CN V: Facial sensation is normal   CN VII: Full and symmetric facial movement  CN VIII: Hearing is normal   CN IX, X: Palate elevates symmetrically  Normal gag reflex  CN XI: Shoulder shrug strength is normal   CN XII: Tongue midline without atrophy or fasciculations  Motor:   Strength 5/5 in all 4 extremities  No pronator drift  Normal rapid alternating movements  Bulk/tone - normal   Fasiculations - none    Sensory:   Sensation intact to soft touch, vibration and pinprick in all 4 extremities  Cerebellar:   Finger-to-nose intact, normal heel to shin      Reflexes: 2+ in all 4 extremities  Pathologic reflexes - babinski reflex negative, Hoffmans reflex - negative    Gait: Normal gait, able to tandem walk, able to tip-toe, able to walk on heels, normal arm to swing  Rhomberg sign negative  Neurological Exam      ROS:    Review of Systems   Constitutional: Negative  Negative for appetite change and fever  HENT: Negative  Negative for hearing loss, tinnitus, trouble swallowing and voice change  Eyes: Negative  Negative for photophobia and pain  Respiratory: Negative  Negative for shortness of breath  Cardiovascular: Negative  Negative for palpitations  Gastrointestinal: Negative  Negative for nausea and vomiting  Endocrine: Negative  Negative for cold intolerance  Genitourinary: Negative  Negative for dysuria, frequency and urgency  Musculoskeletal: Negative  Negative for myalgias and neck pain  Skin: Negative  Negative for rash  Neurological: Positive for numbness and headaches  Negative for dizziness, tremors, seizures, syncope, facial asymmetry, speech difficulty, weakness and light-headedness  Hematological: Negative  Does not bruise/bleed easily  Psychiatric/Behavioral: Negative  Negative for confusion, hallucinations and sleep disturbance

## 2022-08-10 DIAGNOSIS — I10 HTN (HYPERTENSION), BENIGN: ICD-10-CM

## 2022-08-10 RX ORDER — LISINOPRIL 20 MG/1
TABLET ORAL
Qty: 180 TABLET | Refills: 1 | Status: SHIPPED | OUTPATIENT
Start: 2022-08-10

## 2022-08-26 DIAGNOSIS — Z76.0 MEDICATION REFILL: ICD-10-CM

## 2022-08-26 RX ORDER — MONTELUKAST SODIUM 10 MG/1
TABLET ORAL
Qty: 90 TABLET | Refills: 1 | Status: SHIPPED | OUTPATIENT
Start: 2022-08-26

## 2022-08-29 ENCOUNTER — HOSPITAL ENCOUNTER (OUTPATIENT)
Dept: MRI IMAGING | Facility: HOSPITAL | Age: 47
Discharge: HOME | End: 2022-08-29
Attending: PSYCHIATRY & NEUROLOGY
Payer: COMMERCIAL

## 2022-08-29 DIAGNOSIS — G43.709 CHRONIC MIGRAINE WITHOUT AURA: ICD-10-CM

## 2022-08-29 PROCEDURE — 70551 MRI BRAIN STEM W/O DYE: CPT

## 2022-08-29 PROCEDURE — G1004 CDSM NDSC: HCPCS

## 2022-10-10 ENCOUNTER — OFFICE VISIT (OUTPATIENT)
Dept: FAMILY MEDICINE CLINIC | Facility: CLINIC | Age: 47
End: 2022-10-10
Payer: COMMERCIAL

## 2022-10-10 VITALS
WEIGHT: 168 LBS | TEMPERATURE: 98 F | SYSTOLIC BLOOD PRESSURE: 110 MMHG | BODY MASS INDEX: 24.88 KG/M2 | OXYGEN SATURATION: 99 % | HEART RATE: 80 BPM | DIASTOLIC BLOOD PRESSURE: 72 MMHG | RESPIRATION RATE: 20 BRPM | HEIGHT: 69 IN

## 2022-10-10 DIAGNOSIS — G43.709 CHRONIC MIGRAINE WITHOUT AURA: ICD-10-CM

## 2022-10-10 DIAGNOSIS — E78.1 HYPERTRIGLYCERIDEMIA: ICD-10-CM

## 2022-10-10 DIAGNOSIS — E55.9 VITAMIN D DEFICIENCY: ICD-10-CM

## 2022-10-10 DIAGNOSIS — Z12.11 COLON CANCER SCREENING: ICD-10-CM

## 2022-10-10 DIAGNOSIS — R73.03 PREDIABETES: ICD-10-CM

## 2022-10-10 DIAGNOSIS — I10 HTN (HYPERTENSION), BENIGN: Primary | ICD-10-CM

## 2022-10-10 DIAGNOSIS — Z00.00 ANNUAL PHYSICAL EXAM: ICD-10-CM

## 2022-10-10 PROCEDURE — 99396 PREV VISIT EST AGE 40-64: CPT | Performed by: NURSE PRACTITIONER

## 2022-10-10 PROCEDURE — 99213 OFFICE O/P EST LOW 20 MIN: CPT | Performed by: NURSE PRACTITIONER

## 2022-10-10 RX ORDER — SUMATRIPTAN 100 MG/1
100 TABLET, FILM COATED ORAL ONCE AS NEEDED
Qty: 9 TABLET | Refills: 2 | Status: SHIPPED | OUTPATIENT
Start: 2022-10-10

## 2022-10-10 NOTE — PROGRESS NOTES
[facility]        NAME: Han Finley is a 48 y o  male  : 1975    MRN: [id number]  DATE: October 10, 2022  TIME: 6:37 PM    Assessment and Plan   HTN (hypertension), benign [I10]  1  HTN (hypertension), benign  Comprehensive metabolic panel   2  Vitamin D deficiency  Vitamin D 25 hydroxy   3  Hypertriglyceridemia  Comprehensive metabolic panel    Lipid panel   4  Prediabetes  HEMOGLOBIN A1C W/ EAG ESTIMATION   5  Chronic migraine without aura  SUMAtriptan (Imitrex) 100 mg tablet   6  Colon cancer screening  Cologuard   7  Annual physical exam           Patient Instructions     Patient Instructions   Continue same medications as directed  Get labs done before next visit  6 month medcheck or call sooner for problems/concerns  Complete cologuard as agreed upon          Chief Complaint     Chief Complaint   Patient presents with   • Follow-up         History of Present Illness       Here for 6 month medcheck and lab review  HgA1c is 5 9, discussed prediabetes and low carb/diabetic diet- reports drinking soda, will cut this out  Reviewed all medications, is doing well on current doses      Review of Systems   Review of Systems   Constitutional: Negative for activity change, diaphoresis, fatigue and fever  HENT: Negative for congestion, facial swelling, hearing loss, rhinorrhea, sinus pressure, sinus pain, sneezing, sore throat and voice change  Eyes: Negative for discharge and visual disturbance  Respiratory: Negative for cough, choking, chest tightness, shortness of breath, wheezing and stridor  Cardiovascular: Negative for chest pain, palpitations and leg swelling  Gastrointestinal: Negative for abdominal distention, abdominal pain, constipation, diarrhea, nausea and vomiting  Endocrine: Negative for polydipsia, polyphagia and polyuria  Genitourinary: Negative for difficulty urinating, dysuria, frequency and urgency     Musculoskeletal: Negative for arthralgias, back pain, gait problem, joint swelling, myalgias, neck pain and neck stiffness  Skin: Negative for color change, rash and wound  Neurological: Negative for dizziness, syncope, speech difficulty, weakness, light-headedness and headaches  Hematological: Negative for adenopathy  Does not bruise/bleed easily  Psychiatric/Behavioral: Negative for agitation, behavioral problems, confusion, hallucinations, sleep disturbance and suicidal ideas  The patient is nervous/anxious (improved with Atarax- takes a couple times a week)  Current Medications       Current Outpatient Medications:   •  atorvastatin (LIPITOR) 10 mg tablet, Take 1 tablet (10 mg total) by mouth daily, Disp: 90 tablet, Rfl: 3  •  B-D ALLERGY SYRINGE 1CC/28G 28G X 1/2" 1 ML MISC, 3 SYRINGES PER WEEK FOR ALLERGY INJECTIONS, Disp: , Rfl:   •  Cholecalciferol (Vitamin D) 125 MCG (5000 UT) CAPS, Take 5,000 Units by mouth in the morning, Disp: , Rfl:   •  EPINEPHrine (EPIPEN) 0 3 mg/0 3 mL SOAJ, Inject 0 3 mg into a muscle, Disp: , Rfl:   •  fexofenadine (ALLEGRA) 180 MG tablet, TAKE 1 TABLET (180 MG TOTAL) BY MOUTH DAILY  , Disp: , Rfl:   •  hydrOXYzine HCL (ATARAX) 50 mg tablet, Take 1 tablet (50 mg total) by mouth 3 (three) times a day as needed for anxiety, Disp: 30 tablet, Rfl: 0  •  lisinopril (ZESTRIL) 20 mg tablet, TAKE 1 TABLET BY MOUTH TWICE A DAY, Disp: 180 tablet, Rfl: 1  •  montelukast (SINGULAIR) 10 mg tablet, TAKE 1 TABLET BY MOUTH EVERYDAY AT BEDTIME, Disp: 90 tablet, Rfl: 1  •  SUMAtriptan (Imitrex) 100 mg tablet, Take 1 tablet (100 mg total) by mouth once as needed for migraine for up to 1 dose, Disp: 9 tablet, Rfl: 2  •  Syringe/Needle, Disp, 28G X 1/2" 1 ML MISC, 3 syringes per week for allergy injections, Disp: , Rfl:   •  Riboflavin-Magnesium-Feverfew 200-180-50 MG TABS, Take 200 mg by mouth in the morning, Disp: 30 tablet, Rfl: 3    Current Allergies     Allergies as of 10/10/2022 - Reviewed 10/10/2022   Allergen Reaction Noted • Pollen extract  06/22/2017            The following portions of the patient's history were reviewed and updated as appropriate: allergies, current medications, past family history, past medical history, past social history, past surgical history and problem list      Past Medical History:   Diagnosis Date   • Allergic    • Hypertension        Past Surgical History:   Procedure Laterality Date   • NOSE SURGERY     • WISDOM TOOTH EXTRACTION         Family History   Problem Relation Age of Onset   • Depression Mother    • Depression Father    • Hypertension Family          Medications have been verified  Objective   /72   Pulse 80   Temp 98 °F (36 7 °C) (Tympanic)   Resp 20   Ht 5' 9" (1 753 m)   Wt 76 2 kg (168 lb)   SpO2 99%   BMI 24 81 kg/m²        Physical Exam     Physical Exam  Vitals and nursing note reviewed  Constitutional:       General: He is not in acute distress  Appearance: He is well-developed  He is not diaphoretic  HENT:      Head: Normocephalic and atraumatic  Right Ear: Tympanic membrane, ear canal and external ear normal       Left Ear: Tympanic membrane, ear canal and external ear normal       Nose: Nose normal       Mouth/Throat:      Pharynx: Uvula midline  No oropharyngeal exudate  Eyes:      General:         Right eye: No discharge  Left eye: No discharge  Conjunctiva/sclera: Conjunctivae normal       Pupils: Pupils are equal, round, and reactive to light  Neck:      Thyroid: No thyromegaly  Trachea: No tracheal deviation  Cardiovascular:      Rate and Rhythm: Normal rate and regular rhythm  Heart sounds: Normal heart sounds  No murmur heard  Pulmonary:      Effort: Pulmonary effort is normal  No respiratory distress  Breath sounds: Normal breath sounds  No wheezing  Musculoskeletal:         General: Normal range of motion  Cervical back: Normal range of motion and neck supple     Lymphadenopathy:      Cervical: No cervical adenopathy  Skin:     General: Skin is warm and dry  Neurological:      Mental Status: He is alert and oriented to person, place, and time  Psychiatric:         Mood and Affect: Mood normal          Behavior: Behavior normal          Thought Content:  Thought content normal          Judgment: Judgment normal

## 2022-10-10 NOTE — PROGRESS NOTES
HPI:  Sendy Dale is a 48 y o  male here for his yearly health maintenance exam    Patient Active Problem List   Diagnosis   • Overweight (BMI 25 0-29  9)   • HTN (hypertension), benign   • Hypertriglyceridemia   • Overweight with body mass index (BMI) of 26 to 26 9 in adult   • Anxiety   • Constipation   • COVID-19   • Cough   • Chronic migraine without aura   • Prediabetes   • Vitamin D deficiency     Past Medical History:   Diagnosis Date   • Allergic    • Hypertension          Depression Screening and Follow-up Plan: Patient was screened for depression during today's encounter  They screened negative with a PHQ-2 score of 0  PHQ-2/9 Depression Screening    Little interest or pleasure in doing things: 0 - not at all  Feeling down, depressed, or hopeless: 0 - not at all  PHQ-2 Score: 0  PHQ-2 Interpretation: Negative depression screen           Current Outpatient Medications   Medication Sig Dispense Refill   • atorvastatin (LIPITOR) 10 mg tablet Take 1 tablet (10 mg total) by mouth daily 90 tablet 3   • B-D ALLERGY SYRINGE 1CC/28G 28G X 1/2" 1 ML MISC 3 SYRINGES PER WEEK FOR ALLERGY INJECTIONS     • Cholecalciferol (Vitamin D) 125 MCG (5000 UT) CAPS Take 5,000 Units by mouth in the morning     • EPINEPHrine (EPIPEN) 0 3 mg/0 3 mL SOAJ Inject 0 3 mg into a muscle     • fexofenadine (ALLEGRA) 180 MG tablet TAKE 1 TABLET (180 MG TOTAL) BY MOUTH DAILY       • hydrOXYzine HCL (ATARAX) 50 mg tablet Take 1 tablet (50 mg total) by mouth 3 (three) times a day as needed for anxiety 30 tablet 0   • lisinopril (ZESTRIL) 20 mg tablet TAKE 1 TABLET BY MOUTH TWICE A  tablet 1   • montelukast (SINGULAIR) 10 mg tablet TAKE 1 TABLET BY MOUTH EVERYDAY AT BEDTIME 90 tablet 1   • SUMAtriptan (Imitrex) 100 mg tablet Take 1 tablet (100 mg total) by mouth once as needed for migraine for up to 1 dose 9 tablet 2   • Syringe/Needle, Disp, 28G X 1/2" 1 ML MISC 3 syringes per week for allergy injections     • Riboflavin-Magnesium-Feverfew 200-180-50 MG TABS Take 200 mg by mouth in the morning 30 tablet 3     No current facility-administered medications for this visit  Allergies   Allergen Reactions   • Pollen Extract      Immunization History   Administered Date(s) Administered   • COVID-19 PFIZER VACCINE 0 3 ML IM 01/25/2021, 02/15/2021, 11/28/2021   • H1N1, All Formulations 01/30/2010   • INFLUENZA 10/01/2000, 10/13/2010, 09/29/2016, 09/21/2017, 10/09/2020, 11/01/2021   • Influenza, injectable, quadrivalent, preservative free 0 5 mL 10/14/2019   • Td (adult), Unspecified 01/01/2001   • Tdap 03/21/2011       Patient Care Team:  Josef Almeida as PCP - General (Family Medicine)  Josef Almeida as PCP - PCP-[facility] Attributed-Roster    Review of Systems   Constitutional: Negative for activity change, diaphoresis, fatigue and fever  HENT: Negative for congestion, facial swelling, hearing loss, rhinorrhea, sinus pressure, sinus pain, sneezing, sore throat and voice change  Eyes: Negative for discharge and visual disturbance  Respiratory: Negative for cough, choking, chest tightness, shortness of breath, wheezing and stridor  Cardiovascular: Negative for chest pain, palpitations and leg swelling  Gastrointestinal: Negative for abdominal distention, abdominal pain, constipation, diarrhea, nausea and vomiting  Endocrine: Negative for polydipsia, polyphagia and polyuria  Genitourinary: Negative for difficulty urinating, dysuria, frequency and urgency  Musculoskeletal: Negative for arthralgias, back pain, gait problem, joint swelling, myalgias, neck pain and neck stiffness  Skin: Negative for color change, rash and wound  Neurological: Negative for dizziness, syncope, speech difficulty, weakness, light-headedness and headaches  Hematological: Negative for adenopathy  Does not bruise/bleed easily     Psychiatric/Behavioral: Negative for agitation, behavioral problems, confusion, hallucinations, sleep disturbance and suicidal ideas  The patient is nervous/anxious  Physical Exam :  Physical Exam  Vitals and nursing note reviewed  Constitutional:       General: He is not in acute distress  Appearance: He is well-developed  He is not diaphoretic  HENT:      Head: Normocephalic and atraumatic  Right Ear: Tympanic membrane, ear canal and external ear normal       Left Ear: Tympanic membrane, ear canal and external ear normal       Nose: Nose normal       Mouth/Throat:      Mouth: Mucous membranes are moist       Pharynx: Oropharynx is clear  Uvula midline  No oropharyngeal exudate  Eyes:      General:         Right eye: No discharge  Left eye: No discharge  Conjunctiva/sclera: Conjunctivae normal       Pupils: Pupils are equal, round, and reactive to light  Neck:      Thyroid: No thyromegaly  Trachea: No tracheal deviation  Cardiovascular:      Rate and Rhythm: Normal rate and regular rhythm  Heart sounds: Normal heart sounds  No murmur heard  Pulmonary:      Effort: Pulmonary effort is normal  No respiratory distress  Breath sounds: Normal breath sounds  No wheezing  Musculoskeletal:         General: Normal range of motion  Cervical back: Normal range of motion and neck supple  Right lower leg: No edema  Left lower leg: No edema  Lymphadenopathy:      Cervical: No cervical adenopathy  Skin:     General: Skin is warm and dry  Neurological:      Mental Status: He is alert and oriented to person, place, and time  Psychiatric:         Mood and Affect: Mood normal          Behavior: Behavior normal          Thought Content:  Thought content normal          Judgment: Judgment normal            Reviewed Updated [facility] Prior Wellness Visits:   Last Health Maintenance visit information was reviewed, patient interviewed , no change since last HM visit yes  Last HM visit information was reviewed, patient interviewed and updates made to the record today yes    Assessment and Plan:  1  Annual physical exam     2  Vitamin D deficiency  Vitamin D 25 hydroxy   3  Hypertriglyceridemia  Comprehensive metabolic panel    Lipid panel   4  Prediabetes  HEMOGLOBIN A1C W/ EAG ESTIMATION   5  HTN (hypertension), benign  Comprehensive metabolic panel   6  Chronic migraine without aura  SUMAtriptan (Imitrex) 100 mg tablet   7   Colon cancer screening  [facility]       Health Maintenance Due   Topic Date Due   • Hepatitis C Screening  Never done   • HIV Screening  Never done   • Colorectal Cancer Screening  Never done   • DTaP,Tdap,and Td Vaccines (2 - Td or Tdap) 03/21/2021   • Influenza Vaccine (1) 09/01/2022

## 2022-10-10 NOTE — PATIENT INSTRUCTIONS
Continue same medications as directed  Get labs done before next visit  6 month medcheck or call sooner for problems/concerns  Complete cologuard as agreed upon

## 2022-10-11 PROBLEM — R05.9 COUGH: Status: RESOLVED | Noted: 2022-07-15 | Resolved: 2022-10-11

## 2022-10-25 ENCOUNTER — VBI (OUTPATIENT)
Dept: ADMINISTRATIVE | Facility: OTHER | Age: 47
End: 2022-10-25

## 2022-10-27 ENCOUNTER — OFFICE VISIT (OUTPATIENT)
Dept: URGENT CARE | Facility: CLINIC | Age: 47
End: 2022-10-27
Payer: COMMERCIAL

## 2022-10-27 VITALS — OXYGEN SATURATION: 99 % | SYSTOLIC BLOOD PRESSURE: 140 MMHG | HEART RATE: 74 BPM | DIASTOLIC BLOOD PRESSURE: 90 MMHG

## 2022-10-27 DIAGNOSIS — B35.1 ONYCHOMYCOSIS: Primary | ICD-10-CM

## 2022-10-27 PROCEDURE — 99213 OFFICE O/P EST LOW 20 MIN: CPT

## 2022-10-27 NOTE — PROGRESS NOTES
[facility]        NAME: Jovana Almazan is a 48 y o  male  : 1975    MRN: [id number]  DATE: 2022  TIME: 7:45 PM    Assessment and Plan   Onychomycosis [B35 1]  1  Onychomycosis  Ambulatory Referral to Podiatry     Onychomycosis to right great toe  Referral placed to podiatry  Ibuprofen or Tylenol for pain  Patient agrees with plan of care, all questions and concerns were addressed at this time  Patient Instructions     Podiatry referral    Ibuprofen or Tylenol for pain  Follow up with PCP in 3-5 days  Proceed to  ER if symptoms worsen  Chief Complaint     Chief Complaint   Patient presents with   • Foot Pain     Right foot big toe, swollen and red  Began hurting about Tuesday  Constant pressure feeling at the site of the nail out portion around the nail  Red in color  Patient did not coretta his toe nor foot into anything  Pain just began out of nowhere  History of Present Illness       Patient presenting for evaluation of right great toe pain  He denies any trauma or injury to the area, but states that he feels as if his toenail is  from his nail bed  He denies any current treatment for his symptoms  Patient states that he wears steel-toed boots to work, and feels as if his feet are normally moist   He states that he stands for 4-6 hours a day  He denies any drainage from his right great toe, fevers or chills  Review of Systems   Review of Systems   Constitutional: Negative for chills and fever  HENT: Negative for ear pain and sore throat  Eyes: Negative for pain and visual disturbance  Respiratory: Negative for cough and shortness of breath  Cardiovascular: Negative for chest pain and palpitations  Gastrointestinal: Negative for abdominal pain and vomiting  Genitourinary: Negative for dysuria and hematuria  Musculoskeletal: Positive for arthralgias  Negative for back pain  Skin: Negative for color change and rash     Neurological: Negative for seizures and syncope  All other systems reviewed and are negative  Current Medications       Current Outpatient Medications:   •  atorvastatin (LIPITOR) 10 mg tablet, Take 1 tablet (10 mg total) by mouth daily, Disp: 90 tablet, Rfl: 3  •  B-D ALLERGY SYRINGE 1CC/28G 28G X 1/2" 1 ML MISC, 3 SYRINGES PER WEEK FOR ALLERGY INJECTIONS, Disp: , Rfl:   •  Cholecalciferol (Vitamin D) 125 MCG (5000 UT) CAPS, Take 5,000 Units by mouth in the morning, Disp: , Rfl:   •  EPINEPHrine (EPIPEN) 0 3 mg/0 3 mL SOAJ, Inject 0 3 mg into a muscle, Disp: , Rfl:   •  fexofenadine (ALLEGRA) 180 MG tablet, TAKE 1 TABLET (180 MG TOTAL) BY MOUTH DAILY  , Disp: , Rfl:   •  hydrOXYzine HCL (ATARAX) 50 mg tablet, Take 1 tablet (50 mg total) by mouth 3 (three) times a day as needed for anxiety, Disp: 30 tablet, Rfl: 0  •  lisinopril (ZESTRIL) 20 mg tablet, TAKE 1 TABLET BY MOUTH TWICE A DAY, Disp: 180 tablet, Rfl: 1  •  montelukast (SINGULAIR) 10 mg tablet, TAKE 1 TABLET BY MOUTH EVERYDAY AT BEDTIME, Disp: 90 tablet, Rfl: 1  •  Riboflavin-Magnesium-Feverfew 200-180-50 MG TABS, Take 200 mg by mouth in the morning, Disp: 30 tablet, Rfl: 3  •  SUMAtriptan (Imitrex) 100 mg tablet, Take 1 tablet (100 mg total) by mouth once as needed for migraine for up to 1 dose, Disp: 9 tablet, Rfl: 2  •  Syringe/Needle, Disp, 28G X 1/2" 1 ML MISC, 3 syringes per week for allergy injections, Disp: , Rfl:     Current Allergies     Allergies as of 10/27/2022 - Reviewed 10/27/2022   Allergen Reaction Noted   • Pollen extract  06/22/2017            The following portions of the patient's history were reviewed and updated as appropriate: allergies, current medications, past family history, past medical history, past social history, past surgical history and problem list      Past Medical History:   Diagnosis Date   • Allergic    • Hypertension        Past Surgical History:   Procedure Laterality Date   • NOSE SURGERY     • WISDOM TOOTH EXTRACTION Family History   Problem Relation Age of Onset   • Depression Mother    • Depression Father    • Hypertension Family          Medications have been verified  Objective   /90   Pulse 74   SpO2 99%        Physical Exam     Physical Exam  Vitals and nursing note reviewed  Constitutional:       General: He is not in acute distress  Appearance: Normal appearance  He is normal weight  He is not ill-appearing, toxic-appearing or diaphoretic  HENT:      Head: Normocephalic and atraumatic  Right Ear: Tympanic membrane normal       Left Ear: Tympanic membrane normal       Nose: Nose normal  No congestion or rhinorrhea  Mouth/Throat:      Mouth: Mucous membranes are moist       Pharynx: Oropharynx is clear  No oropharyngeal exudate or posterior oropharyngeal erythema  Eyes:      General:         Right eye: No discharge  Left eye: No discharge  Extraocular Movements: Extraocular movements intact  Conjunctiva/sclera: Conjunctivae normal       Pupils: Pupils are equal, round, and reactive to light  Cardiovascular:      Rate and Rhythm: Normal rate and regular rhythm  Pulses: Normal pulses  Heart sounds: Normal heart sounds  No murmur heard  No friction rub  No gallop  Pulmonary:      Effort: Pulmonary effort is normal  No respiratory distress  Breath sounds: Normal breath sounds  No stridor  No wheezing, rhonchi or rales  Chest:      Chest wall: No tenderness  Abdominal:      General: Abdomen is flat  Bowel sounds are normal       Palpations: Abdomen is soft  Tenderness: There is no abdominal tenderness  There is no guarding or rebound  Musculoskeletal:         General: No tenderness  Normal range of motion  Cervical back: Normal range of motion and neck supple  No tenderness  Feet:    Feet:      Left foot:      Skin integrity: Erythema present  No ulcer, blister, skin breakdown, warmth, callus, dry skin or fissure  Toenail Condition: Fungal disease present  Skin:     General: Skin is warm and dry  Capillary Refill: Capillary refill takes less than 2 seconds  Neurological:      General: No focal deficit present  Mental Status: He is alert and oriented to person, place, and time     Psychiatric:         Mood and Affect: Mood normal          Behavior: Behavior normal

## 2022-10-27 NOTE — PATIENT INSTRUCTIONS
Please take Tylenol or Ibuprofen as needed for pain  The Podiatry team will contact you to schedule an appointment

## 2022-11-01 ENCOUNTER — OFFICE VISIT (OUTPATIENT)
Dept: PODIATRY | Facility: CLINIC | Age: 47
End: 2022-11-01

## 2022-11-01 VITALS
WEIGHT: 175 LBS | SYSTOLIC BLOOD PRESSURE: 134 MMHG | HEART RATE: 88 BPM | BODY MASS INDEX: 25.92 KG/M2 | DIASTOLIC BLOOD PRESSURE: 86 MMHG | HEIGHT: 69 IN

## 2022-11-01 DIAGNOSIS — S99.929A INJURY OF GREAT TOENAIL: ICD-10-CM

## 2022-11-01 DIAGNOSIS — M79.675 TOE PAIN, LEFT: Primary | ICD-10-CM

## 2022-11-01 RX ORDER — LIDOCAINE HYDROCHLORIDE 10 MG/ML
3 INJECTION, SOLUTION INFILTRATION; PERINEURAL ONCE
Status: COMPLETED | OUTPATIENT
Start: 2022-11-01 | End: 2022-11-01

## 2022-11-01 RX ADMIN — LIDOCAINE HYDROCHLORIDE 3 ML: 10 INJECTION, SOLUTION INFILTRATION; PERINEURAL at 08:30

## 2022-11-01 NOTE — LETTER
November 1, 2022     Patient: Myrna North  YOB: 1975  Date of Visit: 11/1/2022      To Whom it May Concern:    Dayana Mi is under my professional care  Gloria Chung was seen in my office on 11/1/2022  Serg had minor toenail procedure today in office, patient will return to work on 11/1/22  If you have any questions or concerns, please don't hesitate to call           Sincerely,          Flor Rice DPM        CC: Serg Ragland

## 2022-11-01 NOTE — PROGRESS NOTES
Assessment/Plan:    No problem-specific Assessment & Plan notes found for this encounter  Diagnoses and all orders for this visit:    Toe pain, left  -     lidocaine (XYLOCAINE) 1 % injection 3 mL    Onychomycosis  -     Ambulatory Referral to Podiatry  -     lidocaine (XYLOCAINE) 1 % injection 3 mL      Plan:     - Patient presents with painful partially de-attached toenail plate  I total nail avulsion given localized pain and erythema       - The procedure, anesthesia, complications and alternative care were explained in great detail  No warranties or guarantees were given as to the outcome of the procedure  Verbal consent was obtained from the patient  3cc of 1% lidocaine plain was injected in to the left hallux following sterile alcohol prep  The toe was prepped in sterile fashion  A tourniquet was applied to the hallux for hemostasis  Under sterile conditions the (total) nail plate was removed  The tourniquet was removed after verifying all the pathologic nail tissue was removed  A dry sterile dressing with antibiotic ointment was applied to the surgical site  Home care instructions were given to the patient including decreased activity, ice and OTC pain medication as needed for 3 days  Patient will also perform daily dressing changes until the surgical site is fully healed  Patient tolerated the procedure well and with no complications  - Monitor for infection: pus (thick yellow drainage), redness tracking up the foot, fever, nausea, vomiting, fever, chills  If any of these issues arise, call clinic immediately or go to urgent care or emergency room to see provider     - The patient will return in 10 days for follow-up  - Discuss anti-fungal treatments next visit     - PCP notes reviewed       Subjective:      Patient ID: Zheng Garcia is a 48 y o  male  73-year-old male presents with complaint of left toe pain secondary to a partially detached toenail on the nailbed    Patient reports he had noted discoloration and pain since few weeks now  He was seen at PCP earlier this week and was recommended to follow up with Podiatry  Patient reports he does not recall any recent trauma to the area  Reports he has pain with pressure specifically wearing shoes and socks  He denies any other complaints at this time  The following portions of the patient's history were reviewed and updated as appropriate:   He  has a past medical history of Allergic and Hypertension  He   Patient Active Problem List    Diagnosis Date Noted   • Prediabetes 10/10/2022   • Vitamin D deficiency 10/10/2022   • Chronic migraine without aura 08/03/2022   • Anxiety 07/15/2022   • Constipation 07/15/2022   • COVID-19 07/15/2022   • Overweight with body mass index (BMI) of 26 to 26 9 in adult 01/11/2021   • Hypertriglyceridemia 04/06/2020   • Overweight (BMI 25 0-29 9) 09/19/2019   • HTN (hypertension), benign 09/19/2019     He  has a past surgical history that includes Nose surgery and [place] tooth extraction       Review of Systems   Constitutional: Negative for activity change, chills, fatigue and fever  HENT: Negative  Eyes: Negative  Respiratory: Negative for cough and shortness of breath  Cardiovascular: Negative for chest pain, palpitations and leg swelling  Gastrointestinal: Negative for diarrhea, nausea and vomiting  Musculoskeletal: Positive for arthralgias  Negative for back pain, gait problem and joint swelling  Skin: Positive for color change  Negative for wound  Neurological: Negative  Negative for numbness  Psychiatric/Behavioral: Negative for behavioral problems  Objective:      /86 (BP Location: Left arm, Patient Position: Sitting, Cuff Size: Standard)   Pulse 88   Ht 5' 9" (1 753 m)   Wt 79 4 kg (175 lb)   BMI 25 84 kg/m²          Physical Exam  Vitals reviewed  Cardiovascular:      Rate and Rhythm: Normal rate  Pulses: Normal pulses             Dorsalis pedis pulses are 2+ on the left side  Posterior tibial pulses are 2+ on the left side  Pulmonary:      Effort: Pulmonary effort is normal    Musculoskeletal:         General: Tenderness present  Feet:      Left foot:      Toenail Condition: Left toenails are ingrown  Comments: Left toenail plate partially de-attached from the nail bed  There is pain with palpation and located erythema  Toenail plate is discolored  No drainage noted  Skin:     General: Skin is warm and dry  Neurological:      General: No focal deficit present  Mental Status: He is alert  Psychiatric:         Mood and Affect: Mood normal        Nail removal    Date/Time: 11/1/2022 10:42 AM  Performed by: Carlos Khoury DPM  Authorized by: Carlos Khoury DPM     Patient location:  ClinicUniversal Protocol:  Consent: Verbal consent obtained  Risks and benefits: risks, benefits and alternatives were discussed  Consent given by: patient  Patient understanding: patient states understanding of the procedure being performed  Patient identity confirmed: verbally with patient      Location:     Foot:  L big toe  Pre-procedure details:     Skin preparation:  Alcohol and Betadine  Anesthesia (see MAR for exact dosages): Anesthesia method:  Local infiltration    Local anesthetic:  Lidocaine 1% w/o epi (3cc)  Nail Removal:     Nail removed:  Complete  Post-procedure details:     Dressing:  4x4 sterile gauze, antibiotic ointment and gauze roll    Patient tolerance of procedure:   Tolerated well, no immediate complications

## 2022-11-09 LAB — COLOGUARD RESULT REPORTABLE: NEGATIVE

## 2022-11-15 ENCOUNTER — OFFICE VISIT (OUTPATIENT)
Dept: PODIATRY | Facility: CLINIC | Age: 47
End: 2022-11-15

## 2022-11-15 VITALS
WEIGHT: 177 LBS | SYSTOLIC BLOOD PRESSURE: 160 MMHG | HEIGHT: 69 IN | BODY MASS INDEX: 26.22 KG/M2 | DIASTOLIC BLOOD PRESSURE: 84 MMHG | HEART RATE: 96 BPM

## 2022-11-15 DIAGNOSIS — B35.1 TINEA UNGUIUM: Primary | ICD-10-CM

## 2022-11-15 NOTE — PROGRESS NOTES
Assessment/Plan:    No problem-specific Assessment & Plan notes found for this encounter  Diagnoses and all orders for this visit:    Tinea unguium  -     ciclopirox (PENLAC) 8 % solution; Apply topically daily at bedtime Apply daily to the affected toenails for 6 days, on the 7th day remove the medication from the toenail with nail polish remover or acetone and start the cycle again  Plan:     -  Patient was counseled and educated on the condition and the diagnosis  The diagnosis, treatment options and prognosis were discussed in detail    - discussed proper nail trimming techniques with the patient  - diagnosis and treatments were discussed with patient  I offered patient various treatment options including topical OTC and prescription anti-fungal topicals and oral anti-fungal medications  I also explained patient risks and benefits of each treatment  Patient opted for topical anti-fungal  Rx Panlac solution and went over the application protocol  Patient agrees with plan and understands  All questions and concerns were addressed  - return in 3-4 months   - Call if any questions       Subjective:      Patient ID: Kamron Lo is a 48 y o  male  Patient presents to follow-up right hallux total nail avulsion  Patient reports he is doing very well without any pain to the toe  He does complain of discolored toenails for which he would like to be treated for  No other complaints at this time  The following portions of the patient's history were reviewed and updated as appropriate:   He  has a past medical history of Allergic and Hypertension    He   Patient Active Problem List    Diagnosis Date Noted   • Prediabetes 10/10/2022   • Vitamin D deficiency 10/10/2022   • Chronic migraine without aura 08/03/2022   • Anxiety 07/15/2022   • Constipation 07/15/2022   • COVID-19 07/15/2022   • Overweight with body mass index (BMI) of 26 to 26 9 in adult 01/11/2021   • Hypertriglyceridemia 04/06/2020 • Overweight (BMI 25 0-29 9) 09/19/2019   • HTN (hypertension), benign 09/19/2019     He  has a past surgical history that includes Nose surgery and [place] tooth extraction       Review of Systems   All other systems reviewed and are negative  Objective:      /84 (BP Location: Left arm, Patient Position: Sitting, Cuff Size: Standard)   Pulse 96   Ht 5' 9" (1 753 m)   Wt 80 3 kg (177 lb)   BMI 26 14 kg/m²          Physical Exam  Vitals reviewed  Feet:      Right foot:      Toenail Condition: Fungal disease present  Left foot:      Toenail Condition: Fungal disease present  Comments: Right hallux total nail avulsion site appears stable and healed  Multiple discolored thickened mycotic appearing toenails noted bilateral digits

## 2022-11-21 ENCOUNTER — CLINICAL SUPPORT (OUTPATIENT)
Dept: FAMILY MEDICINE CLINIC | Facility: CLINIC | Age: 47
End: 2022-11-21

## 2022-11-21 VITALS — DIASTOLIC BLOOD PRESSURE: 79 MMHG | HEART RATE: 75 BPM | SYSTOLIC BLOOD PRESSURE: 131 MMHG

## 2022-11-21 DIAGNOSIS — Z01.30 BP CHECK: Primary | ICD-10-CM

## 2023-01-03 DIAGNOSIS — R05.1 ACUTE COUGH: Primary | ICD-10-CM

## 2023-01-03 RX ORDER — BENZONATATE 200 MG/1
200 CAPSULE ORAL 3 TIMES DAILY PRN
Qty: 60 CAPSULE | Refills: 0 | Status: SHIPPED | OUTPATIENT
Start: 2023-01-03 | End: 2023-01-12 | Stop reason: SDUPTHER

## 2023-01-03 NOTE — TELEPHONE ENCOUNTER
Sure, you can offer him Tessalon P[place]les  He may have allergies- ask him to take Claritin or Zyrtec everyday  If he wants to be seen- I will squeeze him in when it works for him     If he wants Tessalon [place] you can send to me for approval

## 2023-01-03 NOTE — TELEPHONE ENCOUNTER
Patient called to make an appointment but no times worked for him/ or providers and I asked if I should put in a note, patient has a cough he just can't get rid of was wondering if PCP could call something in for him?  Any questions or if something gets called in [facility] please let patient know [phone]

## 2023-01-03 NOTE — TELEPHONE ENCOUNTER
PT agreeable to trying [facility]  Will call if needs to get in, his work schedule does not work well

## 2023-01-12 DIAGNOSIS — R05.1 ACUTE COUGH: ICD-10-CM

## 2023-01-12 RX ORDER — BENZONATATE 200 MG/1
200 CAPSULE ORAL 3 TIMES DAILY PRN
Qty: 60 CAPSULE | Refills: 0 | Status: SHIPPED | OUTPATIENT
Start: 2023-01-12

## 2023-01-12 NOTE — TELEPHONE ENCOUNTER
Patient requesting refill(s) of: benzonatate    Last filled: 1/3/2023  Last appt:  10/10/2022  Next appt: 4/24/2023  [facility]:  [facility]

## 2023-01-21 DIAGNOSIS — Z76.0 MEDICATION REFILL: ICD-10-CM

## 2023-01-21 DIAGNOSIS — E78.1 HYPERTRIGLYCERIDEMIA: ICD-10-CM

## 2023-01-21 RX ORDER — ATORVASTATIN CALCIUM 10 MG/1
TABLET, FILM COATED ORAL
Qty: 90 TABLET | Refills: 3 | Status: SHIPPED | OUTPATIENT
Start: 2023-01-21

## 2023-01-21 RX ORDER — MONTELUKAST SODIUM 10 MG/1
TABLET ORAL
Qty: 90 TABLET | Refills: 1 | Status: SHIPPED | OUTPATIENT
Start: 2023-01-21

## 2023-01-31 DIAGNOSIS — I10 HTN (HYPERTENSION), BENIGN: ICD-10-CM

## 2023-01-31 RX ORDER — LISINOPRIL 20 MG/1
TABLET ORAL
Qty: 180 TABLET | Refills: 1 | Status: SHIPPED | OUTPATIENT
Start: 2023-01-31

## 2023-02-10 DIAGNOSIS — K58.0 IRRITABLE BOWEL SYNDROME WITH DIARRHEA: ICD-10-CM

## 2023-02-13 RX ORDER — RIFAXIMIN 200 MG/1
TABLET ORAL
Qty: 42 TABLET | Refills: 2 | Status: SHIPPED | OUTPATIENT
Start: 2023-02-13 | End: 2023-02-27

## 2023-02-20 ENCOUNTER — OFFICE VISIT (OUTPATIENT)
Dept: PODIATRY | Facility: CLINIC | Age: 48
End: 2023-02-20

## 2023-02-20 VITALS
BODY MASS INDEX: 25.92 KG/M2 | SYSTOLIC BLOOD PRESSURE: 112 MMHG | WEIGHT: 175 LBS | HEIGHT: 69 IN | HEART RATE: 83 BPM | DIASTOLIC BLOOD PRESSURE: 76 MMHG

## 2023-02-20 DIAGNOSIS — B35.1 TINEA UNGUIUM: Primary | ICD-10-CM

## 2023-02-20 NOTE — PROGRESS NOTES
Assessment/Plan:    No problem-specific Assessment & Plan notes found for this encounter  Diagnoses and all orders for this visit:    Tinea unguium      Plan:      - Patient was counseled and educated on the condition and the diagnosis  The diagnosis, treatment options and prognosis were discussed in detail    -Continue topical Penlac solution to affected discolored toenails  -Return in 6 months for follow-up    Subjective:      Patient ID: Tana Turner is a 50 y o  male  53-year-old male with past medical history as below presents for an evaluation of tinea unguium to right hallux  Patient reports he is doing well he has been applying Penlac as directed  No other complaints at this time  The following portions of the patient's history were reviewed and updated as appropriate: He  has a past medical history of Allergic and Hypertension  He   Patient Active Problem List    Diagnosis Date Noted   • Prediabetes 10/10/2022   • Vitamin D deficiency 10/10/2022   • Chronic migraine without aura 08/03/2022   • Anxiety 07/15/2022   • Constipation 07/15/2022   • COVID-19 07/15/2022   • Overweight with body mass index (BMI) of 26 to 26 9 in adult 01/11/2021   • Hypertriglyceridemia 04/06/2020   • Overweight (BMI 25 0-29 9) 09/19/2019   • HTN (hypertension), benign 09/19/2019     He  has a past surgical history that includes Nose surgery and [place] tooth extraction         Review of Systems   All other systems reviewed and are negative  Objective:      /76 (BP Location: Left arm, Patient Position: Sitting, Cuff Size: Large)   Pulse 83   Ht 5' 9" (1 753 m)   Wt 79 4 kg (175 lb)   BMI 25 84 kg/m²          Physical Exam  Vitals reviewed  Feet:      Right foot:      Toenail Condition: Fungal disease present

## 2023-03-03 ENCOUNTER — TELEPHONE (OUTPATIENT)
Dept: FAMILY MEDICINE CLINIC | Facility: CLINIC | Age: 48
End: 2023-03-03

## 2023-03-03 DIAGNOSIS — G43.709 CHRONIC MIGRAINE WITHOUT AURA: ICD-10-CM

## 2023-03-03 NOTE — TELEPHONE ENCOUNTER
Typically the patient finds a pharmacy that has this in [address] and lets us know where to send it  Can his normal pharmacy order it for him?  Let me know

## 2023-03-03 NOTE — TELEPHONE ENCOUNTER
Patient called for vaxil (Xifaxan?) stating that it is still not sent to the pharmacy,    Called patient back and he stated that the pharmacy did not have it in stock and typically does not, patient is agreeable to any other pharmacy as long as it is in stock,would like call back with location it is sent to,

## 2023-03-09 DIAGNOSIS — K58.0 IRRITABLE BOWEL SYNDROME WITH DIARRHEA: Primary | ICD-10-CM

## 2023-03-09 RX ORDER — MONTELUKAST SODIUM 4 MG/1
1 TABLET, CHEWABLE ORAL 2 TIMES DAILY
Qty: 180 TABLET | Refills: 1 | Status: SHIPPED | OUTPATIENT
Start: 2023-03-09

## 2023-03-14 DIAGNOSIS — R05.1 ACUTE COUGH: ICD-10-CM

## 2023-03-14 RX ORDER — BENZONATATE 200 MG/1
200 CAPSULE ORAL 3 TIMES DAILY PRN
Qty: 60 CAPSULE | Refills: 0 | Status: SHIPPED | OUTPATIENT
Start: 2023-03-14

## 2023-03-14 NOTE — TELEPHONE ENCOUNTER
Patient requesting refill(s) of: Tessalon     Last filled: 1/12/2023  Last appt: 10/10/2022  Next appt: 4/24/2023    [facility]:  [facility]

## 2023-04-28 ENCOUNTER — RA CDI HCC (OUTPATIENT)
Dept: OTHER | Facility: HOSPITAL | Age: 48
End: 2023-04-28

## 2023-05-08 ENCOUNTER — APPOINTMENT (OUTPATIENT)
Dept: RADIOLOGY | Facility: CLINIC | Age: 48
End: 2023-05-08

## 2023-05-08 ENCOUNTER — OFFICE VISIT (OUTPATIENT)
Dept: FAMILY MEDICINE CLINIC | Facility: CLINIC | Age: 48
End: 2023-05-08

## 2023-05-08 VITALS
OXYGEN SATURATION: 99 % | TEMPERATURE: 98 F | BODY MASS INDEX: 23.85 KG/M2 | SYSTOLIC BLOOD PRESSURE: 118 MMHG | DIASTOLIC BLOOD PRESSURE: 72 MMHG | HEIGHT: 69 IN | WEIGHT: 161 LBS | HEART RATE: 88 BPM | RESPIRATION RATE: 20 BRPM

## 2023-05-08 DIAGNOSIS — E55.9 VITAMIN D DEFICIENCY: ICD-10-CM

## 2023-05-08 DIAGNOSIS — Z12.5 PROSTATE CANCER SCREENING: ICD-10-CM

## 2023-05-08 DIAGNOSIS — R05.2 SUBACUTE COUGH: Primary | ICD-10-CM

## 2023-05-08 DIAGNOSIS — R73.03 PREDIABETES: ICD-10-CM

## 2023-05-08 DIAGNOSIS — I10 HTN (HYPERTENSION), BENIGN: ICD-10-CM

## 2023-05-08 DIAGNOSIS — J30.1 SEASONAL ALLERGIC RHINITIS DUE TO POLLEN: ICD-10-CM

## 2023-05-08 DIAGNOSIS — E78.1 HYPERTRIGLYCERIDEMIA: ICD-10-CM

## 2023-05-08 DIAGNOSIS — R05.2 SUBACUTE COUGH: ICD-10-CM

## 2023-05-08 NOTE — PATIENT INSTRUCTIONS
CXR ordered  Get bloodwork done before next visit  Continue same medications  6 month medcheck or call sooner for problems/concerns

## 2023-05-08 NOTE — PROGRESS NOTES
[facility]        NAME: Akash Tatum is a 50 y o  male  : 1975    MRN: [id number]  DATE: May 8, 2023  TIME: 5:15 PM    Assessment and Plan   Subacute cough [R05 2]  1  Subacute cough  XR chest pa & lateral      2  Hypertriglyceridemia  Lipid panel      3  Vitamin D deficiency  Vitamin D 25 hydroxy      4  Prediabetes  HEMOGLOBIN A1C W/ EAG ESTIMATION      5  HTN (hypertension), benign  Comprehensive metabolic panel    CBC and differential      6  Prostate cancer screening  PSA, Total Screen      7  Seasonal allergic rhinitis due to pollen              Patient Instructions     Patient Instructions   CXR ordered  Get bloodwork done before next visit  Continue same medications  6 month medcheck or call sooner for problems/concerns          Chief Complaint     Chief Complaint   Patient presents with   • Follow-up   • Cough         History of Present Illness       Here for 6 month medcheck and review-  Continues with cough- has severe allergies- seen by Allergist last week- told she thinks this is allergy related  We have tried switching BP medications to Amlodipine in - unsure if this helped his cough but he knows it did not work as well for his blood pressure  Reviewed recent labs- HgA1c improved from 5 9 to 5 7, he will continue diet modifications        Review of Systems   Review of Systems   Constitutional: Negative for activity change, diaphoresis, fatigue and fever  HENT: Positive for congestion, postnasal drip and rhinorrhea  Negative for facial swelling, hearing loss, sinus pressure, sinus pain, sneezing, sore throat and voice change  Eyes: Negative for discharge and visual disturbance  Respiratory: Positive for cough  Negative for choking, chest tightness, shortness of breath, wheezing and stridor  Cardiovascular: Negative for chest pain, palpitations and leg swelling     Gastrointestinal: Negative for abdominal distention, abdominal pain, constipation, "diarrhea, nausea and vomiting  Endocrine: Negative for polydipsia, polyphagia and polyuria  Genitourinary: Negative for difficulty urinating, dysuria, frequency and urgency  Musculoskeletal: Negative for arthralgias, back pain, gait problem, joint swelling, myalgias, neck pain and neck stiffness  Skin: Negative for color change, rash and wound  Neurological: Negative for dizziness, syncope, speech difficulty, weakness, light-headedness and headaches  Hematological: Negative for adenopathy  Does not bruise/bleed easily  Psychiatric/Behavioral: Negative for agitation, behavioral problems, confusion, hallucinations, sleep disturbance and suicidal ideas  The patient is not nervous/anxious  Current Medications       Current Outpatient Medications:   •  atorvastatin (LIPITOR) 10 mg tablet, TAKE 1 TABLET BY MOUTH EVERY DAY, Disp: 90 tablet, Rfl: 3  •  B-D ALLERGY SYRINGE 1CC/28G 28G X 1/2\" 1 ML MISC, 3 SYRINGES PER WEEK FOR ALLERGY INJECTIONS, Disp: , Rfl:   •  benzonatate (TESSALON) 200 MG capsule, Take 1 capsule (200 mg total) by mouth 3 (three) times a day as needed for cough, Disp: 60 capsule, Rfl: 2  •  Cholecalciferol (Vitamin D) 125 MCG (5000 UT) CAPS, Take 5,000 Units by mouth in the morning, Disp: , Rfl:   •  ciclopirox (PENLAC) 8 % solution, Apply topically daily at bedtime Apply daily to the affected toenails for 6 days, on the 7th day remove the medication from the toenail with nail polish remover or acetone and start the cycle again  , Disp: 6 6 mL, Rfl: 3  •  colestipol (COLESTID) 1 g tablet, Take 1 tablet (1 g total) by mouth 2 (two) times a day, Disp: 180 tablet, Rfl: 1  •  EPINEPHrine (EPIPEN) 0 3 mg/0 3 mL SOAJ, Inject 0 3 mg into a muscle, Disp: , Rfl:   •  fexofenadine (ALLEGRA) 180 MG tablet, TAKE 1 TABLET (180 MG TOTAL) BY MOUTH DAILY  , Disp: , Rfl:   •  hydrOXYzine HCL (ATARAX) 50 mg tablet, Take 1 tablet (50 mg total) by mouth 3 (three) times a day as needed for anxiety, Disp: " "30 tablet, Rfl: 0  •  lisinopril (ZESTRIL) 20 mg tablet, TAKE 1 TABLET BY MOUTH TWICE A DAY, Disp: 180 tablet, Rfl: 1  •  montelukast (SINGULAIR) 10 mg tablet, TAKE 1 TABLET BY MOUTH EVERYDAY AT BEDTIME, Disp: 90 tablet, Rfl: 1  •  Riboflavin-Magnesium-Feverfew 200-180-50 MG TABS, Take 200 mg by mouth in the morning, Disp: 90 tablet, Rfl: 3  •  SUMAtriptan (Imitrex) 100 mg tablet, Take 1 tablet (100 mg total) by mouth once as needed for migraine for up to 1 dose, Disp: 9 tablet, Rfl: 2  •  Syringe/Needle, Disp, 28G X 1/2\" 1 ML MISC, 3 syringes per week for allergy injections, Disp: , Rfl:     Current Allergies     Allergies as of 05/08/2023 - Reviewed 05/08/2023   Allergen Reaction Noted   • Pollen extract  06/22/2017            The following portions of the patient's history were reviewed and updated as appropriate: allergies, current medications, past family history, past medical history, past social history, past surgical history and problem list      Past Medical History:   Diagnosis Date   • Allergic    • Hypertension        Past Surgical History:   Procedure Laterality Date   • NOSE SURGERY     • WISDOM TOOTH EXTRACTION         Family History   Problem Relation Age of Onset   • Depression Mother    • Depression Father    • Hypertension Family          Medications have been verified  Objective   /72   Pulse 88   Temp 98 °F (36 7 °C) (Tympanic)   Resp 20   Ht 5' 9\" (1 753 m)   Wt 73 kg (161 lb)   SpO2 99%   BMI 23 78 kg/m²        Physical Exam     Physical Exam  Vitals and nursing note reviewed  Constitutional:       General: He is not in acute distress  Appearance: He is well-developed  He is not diaphoretic  HENT:      Head: Normocephalic and atraumatic  Right Ear: Tympanic membrane, ear canal and external ear normal       Left Ear: Tympanic membrane, ear canal and external ear normal       Nose: Congestion and rhinorrhea present  Mouth/Throat:      Pharynx: Uvula midline   " No oropharyngeal exudate  Eyes:      General:         Right eye: No discharge  Left eye: No discharge  Conjunctiva/sclera: Conjunctivae normal       Pupils: Pupils are equal, round, and reactive to light  Neck:      Thyroid: No thyromegaly  Trachea: No tracheal deviation  Cardiovascular:      Rate and Rhythm: Normal rate and regular rhythm  Heart sounds: Normal heart sounds  No murmur heard  Pulmonary:      Effort: Pulmonary effort is normal  No respiratory distress  Breath sounds: Normal breath sounds  No wheezing  Musculoskeletal:         General: Normal range of motion  Cervical back: Normal range of motion and neck supple  Lymphadenopathy:      Cervical: No cervical adenopathy  Skin:     General: Skin is warm and dry  Neurological:      Mental Status: He is alert and oriented to person, place, and time  Psychiatric:         Mood and Affect: Mood normal          Behavior: Behavior normal          Thought Content:  Thought content normal          Judgment: Judgment normal

## 2023-05-18 DIAGNOSIS — G43.709 CHRONIC MIGRAINE WITHOUT AURA: ICD-10-CM

## 2023-05-18 RX ORDER — SUMATRIPTAN 100 MG/1
100 TABLET, FILM COATED ORAL ONCE AS NEEDED
Qty: 9 TABLET | Refills: 2 | Status: SHIPPED | OUTPATIENT
Start: 2023-05-18

## 2023-07-29 DIAGNOSIS — I10 HTN (HYPERTENSION), BENIGN: ICD-10-CM

## 2023-07-29 RX ORDER — LISINOPRIL 20 MG/1
TABLET ORAL
Qty: 180 TABLET | Refills: 1 | Status: SHIPPED | OUTPATIENT
Start: 2023-07-29

## 2023-08-27 DIAGNOSIS — F41.9 ANXIETY: ICD-10-CM

## 2023-08-28 RX ORDER — HYDROXYZINE 50 MG/1
50 TABLET, FILM COATED ORAL 3 TIMES DAILY PRN
Qty: 90 TABLET | Refills: 1 | Status: SHIPPED | OUTPATIENT
Start: 2023-08-28 | End: 2023-09-20

## 2023-08-28 NOTE — TELEPHONE ENCOUNTER
Patient requesting refill(s) of: hydroxizine     Last filled: 7/15/2022  Last appt: 5/8/2023  Next appt:11/20/2023  Pharmacy:    [facility]

## 2023-09-08 DIAGNOSIS — Z76.0 MEDICATION REFILL: ICD-10-CM

## 2023-09-08 RX ORDER — MONTELUKAST SODIUM 10 MG/1
TABLET ORAL
Qty: 90 TABLET | Refills: 1 | Status: SHIPPED | OUTPATIENT
Start: 2023-09-08

## 2023-09-20 DIAGNOSIS — F41.9 ANXIETY: ICD-10-CM

## 2023-09-20 RX ORDER — HYDROXYZINE 50 MG/1
50 TABLET, FILM COATED ORAL 3 TIMES DAILY PRN
Qty: 270 TABLET | Refills: 1 | Status: SHIPPED | OUTPATIENT
Start: 2023-09-20

## 2023-10-06 ENCOUNTER — APPOINTMENT (OUTPATIENT)
Dept: LAB | Facility: CLINIC | Age: 48
End: 2023-10-06
Payer: COMMERCIAL

## 2023-10-06 DIAGNOSIS — E55.9 VITAMIN D DEFICIENCY: ICD-10-CM

## 2023-10-06 DIAGNOSIS — R73.03 PREDIABETES: ICD-10-CM

## 2023-10-06 DIAGNOSIS — E78.1 HYPERTRIGLYCERIDEMIA: ICD-10-CM

## 2023-10-06 DIAGNOSIS — Z12.5 PROSTATE CANCER SCREENING: ICD-10-CM

## 2023-10-06 DIAGNOSIS — I10 HTN (HYPERTENSION), BENIGN: ICD-10-CM

## 2023-10-06 LAB
25(OH)D3 SERPL-MCNC: 75.3 NG/ML (ref 30–100)
ALBUMIN SERPL BCP-MCNC: 4.1 G/DL (ref 3.5–5)
ALP SERPL-CCNC: 51 U/L (ref 34–104)
ALT SERPL W P-5'-P-CCNC: 38 U/L (ref 7–52)
ANION GAP SERPL CALCULATED.3IONS-SCNC: 8 MMOL/L
AST SERPL W P-5'-P-CCNC: 24 U/L (ref 13–39)
BASOPHILS # BLD AUTO: 0.01 THOUSANDS/ÂΜL (ref 0–0.1)
BASOPHILS NFR BLD AUTO: 0 % (ref 0–1)
BILIRUB SERPL-MCNC: 0.57 MG/DL (ref 0.2–1)
BUN SERPL-MCNC: 19 MG/DL (ref 5–25)
CALCIUM SERPL-MCNC: 9.5 MG/DL (ref 8.4–10.2)
CHLORIDE SERPL-SCNC: 101 MMOL/L (ref 96–108)
CHOLEST SERPL-MCNC: 136 MG/DL
CO2 SERPL-SCNC: 29 MMOL/L (ref 21–32)
CREAT SERPL-MCNC: 1.12 MG/DL (ref 0.6–1.3)
EOSINOPHIL # BLD AUTO: 0.08 THOUSAND/ÂΜL (ref 0–0.61)
EOSINOPHIL NFR BLD AUTO: 2 % (ref 0–6)
ERYTHROCYTE [DISTWIDTH] IN BLOOD BY AUTOMATED COUNT: 11.7 % (ref 11.6–15.1)
EST. AVERAGE GLUCOSE BLD GHB EST-MCNC: 128 MG/DL
GFR SERPL CREATININE-BSD FRML MDRD: 76 ML/MIN/1.73SQ M
GLUCOSE P FAST SERPL-MCNC: 97 MG/DL (ref 65–99)
HBA1C MFR BLD: 6.1 %
HCT VFR BLD AUTO: 40.9 % (ref 36.5–49.3)
HDLC SERPL-MCNC: 41 MG/DL
HGB BLD-MCNC: 13.8 G/DL (ref 12–17)
IMM GRANULOCYTES # BLD AUTO: 0 THOUSAND/UL (ref 0–0.2)
IMM GRANULOCYTES NFR BLD AUTO: 0 % (ref 0–2)
LDLC SERPL CALC-MCNC: 75 MG/DL (ref 0–100)
LYMPHOCYTES # BLD AUTO: 1.12 THOUSANDS/ÂΜL (ref 0.6–4.47)
LYMPHOCYTES NFR BLD AUTO: 28 % (ref 14–44)
MCH RBC QN AUTO: 28.6 PG (ref 26.8–34.3)
MCHC RBC AUTO-ENTMCNC: 33.7 G/DL (ref 31.4–37.4)
MCV RBC AUTO: 85 FL (ref 82–98)
MONOCYTES # BLD AUTO: 0.35 THOUSAND/ÂΜL (ref 0.17–1.22)
MONOCYTES NFR BLD AUTO: 9 % (ref 4–12)
NEUTROPHILS # BLD AUTO: 2.43 THOUSANDS/ÂΜL (ref 1.85–7.62)
NEUTS SEG NFR BLD AUTO: 61 % (ref 43–75)
NONHDLC SERPL-MCNC: 95 MG/DL
NRBC BLD AUTO-RTO: 0 /100 WBCS
PLATELET # BLD AUTO: 221 THOUSANDS/UL (ref 149–390)
PMV BLD AUTO: 10.1 FL (ref 8.9–12.7)
POTASSIUM SERPL-SCNC: 4.4 MMOL/L (ref 3.5–5.3)
PROT SERPL-MCNC: 7 G/DL (ref 6.4–8.4)
PSA SERPL-MCNC: 0.68 NG/ML (ref 0–4)
RBC # BLD AUTO: 4.82 MILLION/UL (ref 3.88–5.62)
SODIUM SERPL-SCNC: 138 MMOL/L (ref 135–147)
TRIGL SERPL-MCNC: 101 MG/DL
WBC # BLD AUTO: 3.99 THOUSAND/UL (ref 4.31–10.16)

## 2023-10-06 PROCEDURE — 85025 COMPLETE CBC W/AUTO DIFF WBC: CPT

## 2023-10-06 PROCEDURE — G0103 PSA SCREENING: HCPCS

## 2023-10-06 PROCEDURE — 36415 COLL VENOUS BLD VENIPUNCTURE: CPT

## 2023-10-06 PROCEDURE — 82306 VITAMIN D 25 HYDROXY: CPT

## 2023-10-06 PROCEDURE — 80061 LIPID PANEL: CPT

## 2023-10-06 PROCEDURE — 83036 HEMOGLOBIN GLYCOSYLATED A1C: CPT

## 2023-10-06 PROCEDURE — 80053 COMPREHEN METABOLIC PANEL: CPT

## 2023-11-13 DIAGNOSIS — E78.1 HYPERTRIGLYCERIDEMIA: ICD-10-CM

## 2023-11-13 DIAGNOSIS — Z76.0 MEDICATION REFILL: ICD-10-CM

## 2023-11-13 DIAGNOSIS — I10 HTN (HYPERTENSION), BENIGN: ICD-10-CM

## 2023-11-13 RX ORDER — MONTELUKAST SODIUM 10 MG/1
10 TABLET ORAL
Qty: 90 TABLET | Refills: 3 | Status: SHIPPED | OUTPATIENT
Start: 2023-11-13

## 2023-11-13 RX ORDER — LISINOPRIL 20 MG/1
20 TABLET ORAL 2 TIMES DAILY
Qty: 180 TABLET | Refills: 1 | Status: SHIPPED | OUTPATIENT
Start: 2023-11-13

## 2023-11-13 RX ORDER — ATORVASTATIN CALCIUM 10 MG/1
10 TABLET, FILM COATED ORAL DAILY
Qty: 90 TABLET | Refills: 3 | Status: SHIPPED | OUTPATIENT
Start: 2023-11-13

## 2023-11-13 NOTE — TELEPHONE ENCOUNTER
Patient requesting refill(s) of: Singulair 10mg    Last filled: 9/8/23 #90 x1    Patient requesting refill(s) of: Lisinopril 20mg    Last filled: #180 x1    Patient requesting refill(s) of: Atorvastatin 10mg    Last filled: 1/21/23 #90 x3  Last appt: 5/8/23  Next appt: 11/20/23  Pharmacy: [facility]

## 2023-11-20 ENCOUNTER — OFFICE VISIT (OUTPATIENT)
Dept: FAMILY MEDICINE CLINIC | Facility: CLINIC | Age: 48
End: 2023-11-20
Payer: COMMERCIAL

## 2023-11-20 VITALS
WEIGHT: 160 LBS | HEIGHT: 69 IN | RESPIRATION RATE: 18 BRPM | OXYGEN SATURATION: 99 % | BODY MASS INDEX: 23.7 KG/M2 | HEART RATE: 70 BPM | DIASTOLIC BLOOD PRESSURE: 80 MMHG | SYSTOLIC BLOOD PRESSURE: 122 MMHG | TEMPERATURE: 97.5 F

## 2023-11-20 DIAGNOSIS — Z00.00 ANNUAL PHYSICAL EXAM: Primary | ICD-10-CM

## 2023-11-20 DIAGNOSIS — E78.1 HYPERTRIGLYCERIDEMIA: ICD-10-CM

## 2023-11-20 DIAGNOSIS — E55.9 VITAMIN D DEFICIENCY: ICD-10-CM

## 2023-11-20 DIAGNOSIS — R73.03 PREDIABETES: ICD-10-CM

## 2023-11-20 DIAGNOSIS — I10 HTN (HYPERTENSION), BENIGN: ICD-10-CM

## 2023-11-20 DIAGNOSIS — Z23 ENCOUNTER FOR IMMUNIZATION: ICD-10-CM

## 2023-11-20 PROCEDURE — 90471 IMMUNIZATION ADMIN: CPT

## 2023-11-20 PROCEDURE — 90715 TDAP VACCINE 7 YRS/> IM: CPT

## 2023-11-20 PROCEDURE — 99213 OFFICE O/P EST LOW 20 MIN: CPT | Performed by: NURSE PRACTITIONER

## 2023-11-20 PROCEDURE — 99396 PREV VISIT EST AGE 40-64: CPT | Performed by: NURSE PRACTITIONER

## 2023-11-20 NOTE — PATIENT INSTRUCTIONS
Tdap vaccine today   Continue medications, as directed   Labs when convenient   Return with issues/concerns

## 2023-11-20 NOTE — PROGRESS NOTES
Here for annual physical- has some concerns about his HgA1c- is now 6.1, he reports he has been drinking some sugary drinks but stopped when he saw his HgA1c. Reviewed other labs- all questions answered  Taking his medications as directed        Ghazal Batista is a 50 y.o. male here for his yearly health maintenance exam.   Patient Active Problem List   Diagnosis    Overweight (BMI 25.0-29. 9)    HTN (hypertension), benign    Hypertriglyceridemia    Overweight with body mass index (BMI) of 26 to 26.9 in adult    Anxiety    Constipation    COVID-19    Chronic migraine without aura    Prediabetes    Vitamin D deficiency    Seasonal allergic rhinitis due to pollen     Past Medical History:   Diagnosis Date    Allergic     Hypertension        1. Advanced Directive: na     2. Durable Power of  for Healthcare: na     3. Social History:               Marital History: - Karma              Work Status:  tech              Drug and alcohol History: na              Alcohol Use: na     4.  General Health: good              Regular Dental Visits: has been over a year; had to get new doctor               Vision problems: none; glasses               Hearing Loss:none              Immunizations up to date: utd                 Lifestyle:                           Healthy Diet:regular                          Tobacco Use:none                          Regular exercise:walking                          Weight concerns:none                          Drug abuse: none     PHQ-2/9 Depression Screening    Little interest or pleasure in doing things: 0 - not at all  Feeling down, depressed, or hopeless: 0 - not at all  PHQ-2 Score: 0  PHQ-2 Interpretation: Negative depression screen           Current Outpatient Medications   Medication Sig Dispense Refill    atorvastatin (LIPITOR) 10 mg tablet Take 1 tablet (10 mg total) by mouth daily 90 tablet 3    B-D ALLERGY SYRINGE 1CC/28G 28G X 1/2" 1 ML MISC 3 SYRINGES PER WEEK FOR ALLERGY INJECTIONS      Cholecalciferol (Vitamin D) 125 MCG (5000 UT) CAPS Take 5,000 Units by mouth in the morning      ciclopirox (PENLAC) 8 % solution Apply topically daily at bedtime Apply daily to the affected toenails for 6 days, on the 7th day remove the medication from the toenail with nail polish remover or acetone and start the cycle again. 6.6 mL 3    EPINEPHrine (EPIPEN) 0.3 mg/0.3 mL SOAJ Inject 0.3 mg into a muscle      fexofenadine (ALLEGRA) 180 MG tablet TAKE 1 TABLET (180 MG TOTAL) BY MOUTH DAILY. hydrOXYzine HCL (ATARAX) 50 mg tablet TAKE 1 TABLET BY MOUTH 3 TIMES A DAY AS NEEDED FOR ANXIETY. 270 tablet 1    lisinopril (ZESTRIL) 20 mg tablet Take 1 tablet (20 mg total) by mouth 2 (two) times a day 180 tablet 1    montelukast (SINGULAIR) 10 mg tablet Take 1 tablet (10 mg total) by mouth daily at bedtime 90 tablet 3    SUMAtriptan (IMITREX) 100 mg tablet TAKE 1 TABLET (100 MG TOTAL) BY MOUTH ONCE AS NEEDED FOR MIGRAINE FOR UP TO 1 DOSE 9 tablet 2    Syringe/Needle, Disp, 28G X 1/2" 1 ML MISC 3 syringes per week for allergy injections      Riboflavin-Magnesium-Feverfew 200-180-50 MG TABS Take 200 mg by mouth in the morning (Patient not taking: Reported on 11/20/2023) 90 tablet 3     No current facility-administered medications for this visit.      Allergies   Allergen Reactions    Pollen Extract      Immunization History   Administered Date(s) Administered    COVID-19 Moderna mRNA Vaccine 12 Yr+ 50 mcg/0.5 mL (Spikevax) 11/03/2023    COVID-19 PFIZER VACCINE 0.3 ML IM 01/25/2021, 02/15/2021, 11/28/2021    H1N1, All Formulations 01/30/2010    INFLUENZA 10/01/2000, 10/13/2010, 09/29/2016, 09/21/2017, 10/09/2020, 11/01/2021, 10/20/2022, 09/12/2023    Influenza, injectable, quadrivalent, preservative free 0.5 mL 10/14/2019    Td (adult), Unspecified 01/01/2001    Tdap 03/21/2011, 11/20/2023       Patient Care Team:  Jluis Hagen as PCP - General (Family Medicine)  Juani Rodriguez, [address] [place] [address] as PCP - PCP-[facility] Attributed-Roster    Review of Systems   Constitutional:  Negative for activity change, diaphoresis, fatigue and fever. HENT:  Negative for congestion, facial swelling, hearing loss, rhinorrhea, sinus pressure, sinus pain, sneezing, sore throat and voice change. Eyes:  Negative for discharge and visual disturbance. Respiratory:  Negative for cough, choking, chest tightness, shortness of breath, wheezing and stridor. Cardiovascular:  Negative for chest pain, palpitations and leg swelling. Gastrointestinal:  Negative for abdominal distention, abdominal pain, constipation, diarrhea, nausea and vomiting. Endocrine: Negative for polydipsia, polyphagia and polyuria. Genitourinary:  Negative for difficulty urinating, dysuria, frequency and urgency. Musculoskeletal:  Negative for arthralgias, back pain, gait problem, joint swelling, myalgias, neck pain and neck stiffness. Skin:  Negative for color change, rash and wound. Neurological:  Negative for dizziness, syncope, speech difficulty, weakness, light-headedness and headaches. Hematological:  Negative for adenopathy. Does not bruise/bleed easily. Psychiatric/Behavioral:  Negative for agitation, behavioral problems, confusion, hallucinations, sleep disturbance and suicidal ideas. The patient is not nervous/anxious. All other systems reviewed and are negative. Physical Exam :  Physical Exam  Vitals and nursing note reviewed. Constitutional:       General: He is not in acute distress. Appearance: Normal appearance. He is well-developed. He is not diaphoretic. HENT:      Head: Normocephalic. Right Ear: Tympanic membrane, ear canal and external ear normal.      Left Ear: Tympanic membrane, ear canal and external ear normal.      Nose: Nose normal.      Mouth/Throat:      Mouth: Mucous membranes are moist.      Pharynx: Oropharynx is clear.    Eyes:      Conjunctiva/sclera: Conjunctivae normal. Cardiovascular:      Rate and Rhythm: Normal rate and regular rhythm. Heart sounds: Normal heart sounds. No murmur heard. Pulmonary:      Effort: Pulmonary effort is normal. No respiratory distress. Breath sounds: Normal breath sounds. No wheezing. Musculoskeletal:         General: No tenderness or deformity. Normal range of motion. Cervical back: Normal range of motion. Skin:     General: Skin is warm and dry. Neurological:      Mental Status: He is alert and oriented to person, place, and time. Psychiatric:         Mood and Affect: Mood normal.         Speech: Speech normal.         Behavior: Behavior normal.         Thought Content: Thought content normal.         Judgment: Judgment normal.           Reviewed Updated [facility] Prior Wellness Visits:   Last Health Maintenance visit information was reviewed, patient interviewed , no change since last HM visit no  Last  visit information was reviewed, patient interviewed and updates made to the record today yes    Assessment and Plan:  1. Annual physical exam  Comprehensive metabolic panel    CBC and differential      2. Encounter for immunization  TDAP VACCINE GREATER THAN OR EQUAL TO 8YO IM      3. Hypertriglyceridemia  Lipid panel      4. Vitamin D deficiency  Vitamin D 25 hydroxy      5.  Prediabetes  HEMOGLOBIN A1C W/ EAG ESTIMATION      6. HTN (hypertension), benign            Health Maintenance Due   Topic Date Due    Hepatitis C Screening  Never done    HIV Screening  Never done    Annual Physical  10/10/2023

## 2024-01-08 ENCOUNTER — OFFICE VISIT (OUTPATIENT)
Dept: FAMILY MEDICINE CLINIC | Facility: CLINIC | Age: 49
End: 2024-01-08
Payer: COMMERCIAL

## 2024-01-08 VITALS
SYSTOLIC BLOOD PRESSURE: 126 MMHG | HEART RATE: 71 BPM | HEIGHT: 69 IN | TEMPERATURE: 99.3 F | OXYGEN SATURATION: 99 % | WEIGHT: 158 LBS | RESPIRATION RATE: 18 BRPM | DIASTOLIC BLOOD PRESSURE: 80 MMHG | BODY MASS INDEX: 23.4 KG/M2

## 2024-01-08 DIAGNOSIS — R42 VERTIGO: Primary | ICD-10-CM

## 2024-01-08 DIAGNOSIS — B34.9 ACUTE VIRAL SYNDROME: ICD-10-CM

## 2024-01-08 PROCEDURE — 99213 OFFICE O/P EST LOW 20 MIN: CPT | Performed by: NURSE PRACTITIONER

## 2024-01-08 PROCEDURE — 87636 SARSCOV2 & INF A&B AMP PRB: CPT | Performed by: NURSE PRACTITIONER

## 2024-01-08 RX ORDER — PREDNISONE 20 MG/1
20 TABLET ORAL 2 TIMES DAILY WITH MEALS
Qty: 10 TABLET | Refills: 0 | Status: SHIPPED | OUTPATIENT
Start: 2024-01-08 | End: 2024-01-13

## 2024-01-08 NOTE — PROGRESS NOTES
Name: Serg Fernando      : 1975      MRN: [id number]  Encounter Provider: ROCKY Bell  Encounter Date: 2024   Encounter department: [facility] PRIMARY CARE    Assessment & Plan     1. Vertigo  -     predniSONE 20 mg tablet; Take 1 tablet (20 mg total) by mouth 2 (two) times a day with meals for 5 days    2. Acute viral syndrome  -     predniSONE 20 mg tablet; Take 1 tablet (20 mg total) by mouth 2 (two) times a day with meals for 5 days  -     Covid/Flu- Office Collect           Subjective      Here for dizziness (room spinning) for a few seconds if he leans over or bends overs to  something. This started yesterday. Today while at work he noticed a headache/pressure on bilateral temple. Unsure if sinus congestion. Did not try Imitrex- his typical migraine is usually in the back of his head- different than this.   He is taking his Lisinopril       Review of Systems   Constitutional:  Negative for activity change, diaphoresis, fatigue and fever.   HENT:  Negative for congestion, facial swelling, hearing loss, rhinorrhea, sinus pressure, sinus pain, sneezing, sore throat and voice change.    Eyes:  Negative for discharge and visual disturbance.   Respiratory:  Negative for cough, choking, chest tightness, shortness of breath, wheezing and stridor.    Cardiovascular:  Negative for chest pain, palpitations and leg swelling.   Gastrointestinal:  Negative for abdominal distention, abdominal pain, constipation, diarrhea, nausea and vomiting.   Endocrine: Negative for polydipsia, polyphagia and polyuria.   Genitourinary:  Negative for difficulty urinating, dysuria, frequency and urgency.   Musculoskeletal:  Negative for arthralgias, back pain, gait problem, joint swelling, myalgias, neck pain and neck stiffness.   Skin:  Negative for color change, rash and wound.   Neurological:  Positive for dizziness and headaches. Negative for syncope, speech difficulty, weakness and  "light-headedness.   Hematological:  Negative for adenopathy. Does not bruise/bleed easily.   Psychiatric/Behavioral:  Negative for agitation, behavioral problems, confusion, hallucinations, sleep disturbance and suicidal ideas. The patient is not nervous/anxious.        Current Outpatient Medications on File Prior to Visit   Medication Sig    atorvastatin (LIPITOR) 10 mg tablet Take 1 tablet (10 mg total) by mouth daily    B-D ALLERGY SYRINGE 1CC/28G 28G X 1/2\" 1 ML MISC 3 SYRINGES PER WEEK FOR ALLERGY INJECTIONS    Cholecalciferol (Vitamin D) 125 MCG (5000 UT) CAPS Take 5,000 Units by mouth in the morning    ciclopirox (PENLAC) 8 % solution Apply topically daily at bedtime Apply daily to the affected toenails for 6 days, on the 7th day remove the medication from the toenail with nail polish remover or acetone and start the cycle again.    EPINEPHrine (EPIPEN) 0.3 mg/0.3 mL SOAJ Inject 0.3 mg into a muscle    fexofenadine (ALLEGRA) 180 MG tablet TAKE 1 TABLET (180 MG TOTAL) BY MOUTH DAILY.    hydrOXYzine HCL (ATARAX) 50 mg tablet TAKE 1 TABLET BY MOUTH 3 TIMES A DAY AS NEEDED FOR ANXIETY.    lisinopril (ZESTRIL) 20 mg tablet Take 1 tablet (20 mg total) by mouth 2 (two) times a day    montelukast (SINGULAIR) 10 mg tablet Take 1 tablet (10 mg total) by mouth daily at bedtime    SUMAtriptan (IMITREX) 100 mg tablet TAKE 1 TABLET (100 MG TOTAL) BY MOUTH ONCE AS NEEDED FOR MIGRAINE FOR UP TO 1 DOSE    Syringe/Needle, Disp, 28G X 1/2\" 1 ML MISC 3 syringes per week for allergy injections    [DISCONTINUED] Riboflavin-Magnesium-Feverfew 200-180-50 MG TABS Take 200 mg by mouth in the morning (Patient not taking: Reported on 11/20/2023)       Objective     /80   Pulse 71   Temp 99.3 °F (37.4 °C) (Tympanic)   Resp 18   Ht 5' 9\" (1.753 m)   Wt 71.7 kg (158 lb)   SpO2 99%   BMI 23.33 kg/m²     Physical Exam  Vitals and nursing note reviewed.   Constitutional:       General: He is not in acute distress.     Appearance: " Normal appearance. He is well-developed. He is not diaphoretic.   HENT:      Head: Normocephalic and atraumatic.      Right Ear: Tympanic membrane, ear canal and external ear normal.      Left Ear: Tympanic membrane, ear canal and external ear normal.      Nose: Nose normal.      Mouth/Throat:      Mouth: Mucous membranes are moist.      Pharynx: Uvula midline. No oropharyngeal exudate.   Eyes:      General:         Right eye: No discharge.         Left eye: No discharge.   Neck:      Thyroid: No thyromegaly.      Trachea: No tracheal deviation.   Cardiovascular:      Rate and Rhythm: Normal rate and regular rhythm.      Heart sounds: Normal heart sounds. No murmur heard.  Pulmonary:      Effort: Pulmonary effort is normal. No respiratory distress.      Breath sounds: Normal breath sounds. No wheezing.   Musculoskeletal:         General: No tenderness or deformity. Normal range of motion.      Cervical back: Normal range of motion and neck supple.   Lymphadenopathy:      Cervical: No cervical adenopathy.   Skin:     General: Skin is warm and dry.   Neurological:      Mental Status: He is alert and oriented to person, place, and time.   Psychiatric:         Mood and Affect: Mood normal.         Speech: Speech normal.         Behavior: Behavior normal.         Thought Content: Thought content normal.         Judgment: Judgment normal.       ROCKY Bell

## 2024-01-09 LAB
FLUAV RNA RESP QL NAA+PROBE: NEGATIVE
FLUBV RNA RESP QL NAA+PROBE: NEGATIVE
SARS-COV-2 RNA RESP QL NAA+PROBE: NEGATIVE

## 2024-01-09 NOTE — PATIENT INSTRUCTIONS
Prednisone as directed  PCR flu/covid  Discussed viral symptoms and medications  Call if symptoms worsen/concerns/questions

## 2024-01-24 DIAGNOSIS — K58.0 IRRITABLE BOWEL SYNDROME WITH DIARRHEA: ICD-10-CM

## 2024-03-19 DIAGNOSIS — R05.1 ACUTE COUGH: ICD-10-CM

## 2024-03-19 RX ORDER — BENZONATATE 200 MG/1
200 CAPSULE ORAL 3 TIMES DAILY PRN
Qty: 60 CAPSULE | Refills: 1 | Status: SHIPPED | OUTPATIENT
Start: 2024-03-19

## 2024-04-22 DIAGNOSIS — B96.89 BACTERIAL URI: Primary | ICD-10-CM

## 2024-04-22 DIAGNOSIS — J06.9 BACTERIAL URI: Primary | ICD-10-CM

## 2024-04-22 RX ORDER — AZITHROMYCIN 250 MG/1
TABLET, FILM COATED ORAL
Qty: 6 TABLET | Refills: 0 | Status: SHIPPED | OUTPATIENT
Start: 2024-04-22 | End: 2024-04-26

## 2024-06-13 ENCOUNTER — OFFICE VISIT (OUTPATIENT)
Dept: URGENT CARE | Facility: CLINIC | Age: 49
End: 2024-06-13
Payer: COMMERCIAL

## 2024-06-13 VITALS
WEIGHT: 161.38 LBS | DIASTOLIC BLOOD PRESSURE: 68 MMHG | BODY MASS INDEX: 23.83 KG/M2 | RESPIRATION RATE: 16 BRPM | SYSTOLIC BLOOD PRESSURE: 112 MMHG | OXYGEN SATURATION: 98 % | TEMPERATURE: 97.9 F | HEART RATE: 78 BPM

## 2024-06-13 DIAGNOSIS — S91.302A WOUND OF LEFT FOOT: Primary | ICD-10-CM

## 2024-06-13 PROCEDURE — 99213 OFFICE O/P EST LOW 20 MIN: CPT | Performed by: PHYSICIAN ASSISTANT

## 2024-06-13 NOTE — PROGRESS NOTES
[facility]        NAME: Serg Fernando is a 48 y.o. male  : 1975    MRN: [id number]  DATE: 2024  TIME: 5:12 PM    Assessment and Plan   Wound of left foot [S91.302A]  1. Wound of left foot  Ambulatory Referral to Podiatry            Patient Instructions       Follow up with PCP in 3-5 days.  Proceed to  ER if symptoms worsen.    If tests have been performed at [facility], our office will contact you with results if changes need to be made to the care plan discussed with you at the visit.  You can review your full results on [facility].    Chief Complaint     Chief Complaint   Patient presents with    Wound Infection     On left foot for 2 weeks. Observed opened wound on plantar area of left foot near 5th toe. No fever or chills. Using callus removing bandage.          History of Present Illness       Patient is a 49 y/o/m c/o sore to bottom of left foot.  Patient reports about 2 weeks ago noticing what he thought was a callus.  Patient reports region has split open recently.  Pt is prediabetic.  Pt does wear steel toe boots daily for work.  No drainage from site.  No numbness or tingling.  Patient has not put anything on the region.         Review of Systems   Review of Systems   Constitutional:  Negative for chills and fever.   HENT:  Negative for ear pain and sore throat.    Eyes:  Negative for pain and visual disturbance.   Respiratory:  Negative for cough and shortness of breath.    Cardiovascular:  Negative for chest pain and palpitations.   Gastrointestinal:  Negative for abdominal pain and vomiting.   Genitourinary:  Negative for dysuria and hematuria.   Musculoskeletal:  Negative for arthralgias and back pain.   Skin:  Positive for wound. Negative for color change and rash.   Neurological:  Negative for seizures and syncope.   All other systems reviewed and are negative.        Current Medications       Current Outpatient Medications:     atorvastatin (LIPITOR) 10 mg  "tablet, Take 1 tablet (10 mg total) by mouth daily, Disp: 90 tablet, Rfl: 3    B-D ALLERGY SYRINGE 1CC/28G 28G X 1/2\" 1 ML MISC, 3 SYRINGES PER WEEK FOR ALLERGY INJECTIONS, Disp: , Rfl:     benzonatate (TESSALON) 200 MG capsule, Take 1 capsule (200 mg total) by mouth 3 (three) times a day as needed for cough, Disp: 60 capsule, Rfl: 1    Cholecalciferol (Vitamin D) 125 MCG (5000 UT) CAPS, Take 5,000 Units by mouth in the morning, Disp: , Rfl:     ciclopirox (PENLAC) 8 % solution, Apply topically daily at bedtime Apply daily to the affected toenails for 6 days, on the 7th day remove the medication from the toenail with nail polish remover or acetone and start the cycle again., Disp: 6.6 mL, Rfl: 3    EPINEPHrine (EPIPEN) 0.3 mg/0.3 mL SOAJ, Inject 0.3 mg into a muscle, Disp: , Rfl:     fexofenadine (ALLEGRA) 180 MG tablet, TAKE 1 TABLET (180 MG TOTAL) BY MOUTH DAILY., Disp: , Rfl:     hydrOXYzine HCL (ATARAX) 50 mg tablet, TAKE 1 TABLET BY MOUTH 3 TIMES A DAY AS NEEDED FOR ANXIETY., Disp: 270 tablet, Rfl: 1    lisinopril (ZESTRIL) 20 mg tablet, Take 1 tablet (20 mg total) by mouth 2 (two) times a day, Disp: 180 tablet, Rfl: 1    montelukast (SINGULAIR) 10 mg tablet, Take 1 tablet (10 mg total) by mouth daily at bedtime, Disp: 90 tablet, Rfl: 3    SUMAtriptan (IMITREX) 100 mg tablet, TAKE 1 TABLET (100 MG TOTAL) BY MOUTH ONCE AS NEEDED FOR MIGRAINE FOR UP TO 1 DOSE, Disp: 9 tablet, Rfl: 2    Syringe/Needle, Disp, 28G X 1/2\" 1 ML MISC, 3 syringes per week for allergy injections, Disp: , Rfl:     Current Allergies     Allergies as of 06/13/2024 - Reviewed 06/13/2024   Allergen Reaction Noted    Pollen extract  06/22/2017            The following portions of the patient's history were reviewed and updated as appropriate: allergies, current medications, past family history, past medical history, past social history, past surgical history and problem list.     Past Medical History:   Diagnosis Date    Allergic     " Hypertension        Past Surgical History:   Procedure Laterality Date    NOSE SURGERY      WISDOM TOOTH EXTRACTION         Family History   Problem Relation Age of Onset    Depression Mother     Depression Father     Hypertension Family          Medications have been verified.        Objective   /68   Pulse 78   Temp 97.9 °F (36.6 °C)   Resp 16   Wt 73.2 kg (161 lb 6 oz)   SpO2 98%   BMI 23.83 kg/m²   No LMP for male patient.       Physical Exam     Physical Exam  Constitutional:       Appearance: Normal appearance. He is normal weight.   HENT:      Head: Normocephalic and atraumatic.      Nose: Nose normal.      Mouth/Throat:      Mouth: Mucous membranes are moist.   Eyes:      Extraocular Movements: Extraocular movements intact.      Conjunctiva/sclera: Conjunctivae normal.      Pupils: Pupils are equal, round, and reactive to light.   Cardiovascular:      Rate and Rhythm: Normal rate.   Pulmonary:      Effort: Pulmonary effort is normal.   Musculoskeletal:         General: Normal range of motion.      Cervical back: Normal range of motion and neck supple.        Feet:    Skin:     General: Skin is warm and dry.   Neurological:      General: No focal deficit present.      Mental Status: He is alert and oriented to person, place, and time.   Psychiatric:         Mood and Affect: Mood normal.         Behavior: Behavior normal.

## 2024-06-28 ENCOUNTER — APPOINTMENT (OUTPATIENT)
Dept: LAB | Facility: CLINIC | Age: 49
End: 2024-06-28
Payer: COMMERCIAL

## 2024-06-28 DIAGNOSIS — E78.1 HYPERTRIGLYCERIDEMIA: ICD-10-CM

## 2024-06-28 DIAGNOSIS — E55.9 VITAMIN D DEFICIENCY: ICD-10-CM

## 2024-06-28 DIAGNOSIS — R73.03 PREDIABETES: ICD-10-CM

## 2024-06-28 DIAGNOSIS — Z00.00 ANNUAL PHYSICAL EXAM: ICD-10-CM

## 2024-06-28 LAB
25(OH)D3 SERPL-MCNC: 68.4 NG/ML (ref 30–100)
ALBUMIN SERPL BCG-MCNC: 4.1 G/DL (ref 3.5–5)
ALP SERPL-CCNC: 41 U/L (ref 34–104)
ALT SERPL W P-5'-P-CCNC: 33 U/L (ref 7–52)
ANION GAP SERPL CALCULATED.3IONS-SCNC: 8 MMOL/L (ref 4–13)
AST SERPL W P-5'-P-CCNC: 25 U/L (ref 13–39)
BASOPHILS # BLD AUTO: 0.01 THOUSANDS/ÂΜL (ref 0–0.1)
BASOPHILS NFR BLD AUTO: 0 % (ref 0–1)
BILIRUB SERPL-MCNC: 0.67 MG/DL (ref 0.2–1)
BUN SERPL-MCNC: 16 MG/DL (ref 5–25)
CALCIUM SERPL-MCNC: 9.1 MG/DL (ref 8.4–10.2)
CHLORIDE SERPL-SCNC: 101 MMOL/L (ref 96–108)
CHOLEST SERPL-MCNC: 128 MG/DL
CO2 SERPL-SCNC: 29 MMOL/L (ref 21–32)
CREAT SERPL-MCNC: 1 MG/DL (ref 0.6–1.3)
EOSINOPHIL # BLD AUTO: 0.07 THOUSAND/ÂΜL (ref 0–0.61)
EOSINOPHIL NFR BLD AUTO: 2 % (ref 0–6)
ERYTHROCYTE [DISTWIDTH] IN BLOOD BY AUTOMATED COUNT: 12 % (ref 11.6–15.1)
EST. AVERAGE GLUCOSE BLD GHB EST-MCNC: 120 MG/DL
GFR SERPL CREATININE-BSD FRML MDRD: 87 ML/MIN/1.73SQ M
GLUCOSE P FAST SERPL-MCNC: 99 MG/DL (ref 65–99)
HBA1C MFR BLD: 5.8 %
HCT VFR BLD AUTO: 39.9 % (ref 36.5–49.3)
HDLC SERPL-MCNC: 53 MG/DL
HGB BLD-MCNC: 13.7 G/DL (ref 12–17)
IMM GRANULOCYTES # BLD AUTO: 0 THOUSAND/UL (ref 0–0.2)
IMM GRANULOCYTES NFR BLD AUTO: 0 % (ref 0–2)
LDLC SERPL CALC-MCNC: 58 MG/DL (ref 0–100)
LYMPHOCYTES # BLD AUTO: 1.18 THOUSANDS/ÂΜL (ref 0.6–4.47)
LYMPHOCYTES NFR BLD AUTO: 27 % (ref 14–44)
MCH RBC QN AUTO: 29.8 PG (ref 26.8–34.3)
MCHC RBC AUTO-ENTMCNC: 34.3 G/DL (ref 31.4–37.4)
MCV RBC AUTO: 87 FL (ref 82–98)
MONOCYTES # BLD AUTO: 0.38 THOUSAND/ÂΜL (ref 0.17–1.22)
MONOCYTES NFR BLD AUTO: 9 % (ref 4–12)
NEUTROPHILS # BLD AUTO: 2.76 THOUSANDS/ÂΜL (ref 1.85–7.62)
NEUTS SEG NFR BLD AUTO: 62 % (ref 43–75)
NONHDLC SERPL-MCNC: 75 MG/DL
NRBC BLD AUTO-RTO: 0 /100 WBCS
PLATELET # BLD AUTO: 183 THOUSANDS/UL (ref 149–390)
PMV BLD AUTO: 11.1 FL (ref 8.9–12.7)
POTASSIUM SERPL-SCNC: 4.1 MMOL/L (ref 3.5–5.3)
PROT SERPL-MCNC: 6.7 G/DL (ref 6.4–8.4)
RBC # BLD AUTO: 4.59 MILLION/UL (ref 3.88–5.62)
SODIUM SERPL-SCNC: 138 MMOL/L (ref 135–147)
TRIGL SERPL-MCNC: 86 MG/DL
WBC # BLD AUTO: 4.4 THOUSAND/UL (ref 4.31–10.16)

## 2024-06-28 PROCEDURE — 83036 HEMOGLOBIN GLYCOSYLATED A1C: CPT

## 2024-06-28 PROCEDURE — 82306 VITAMIN D 25 HYDROXY: CPT

## 2024-06-28 PROCEDURE — 36415 COLL VENOUS BLD VENIPUNCTURE: CPT

## 2024-06-28 PROCEDURE — 85025 COMPLETE CBC W/AUTO DIFF WBC: CPT

## 2024-06-28 PROCEDURE — 80053 COMPREHEN METABOLIC PANEL: CPT

## 2024-06-28 PROCEDURE — 80061 LIPID PANEL: CPT

## 2024-07-05 ENCOUNTER — RA CDI HCC (OUTPATIENT)
Dept: OTHER | Facility: HOSPITAL | Age: 49
End: 2024-07-05

## 2024-07-05 PROBLEM — E66.3 OVERWEIGHT (BMI 25.0-29.9): Status: RESOLVED | Noted: 2019-09-19 | Resolved: 2024-07-05

## 2024-07-05 PROBLEM — E66.3 OVERWEIGHT WITH BODY MASS INDEX (BMI) OF 26 TO 26.9 IN ADULT: Status: RESOLVED | Noted: 2021-01-11 | Resolved: 2024-07-05

## 2024-07-05 PROBLEM — U07.1 COVID-19: Status: RESOLVED | Noted: 2022-07-15 | Resolved: 2024-07-05

## 2024-07-12 ENCOUNTER — OFFICE VISIT (OUTPATIENT)
Dept: FAMILY MEDICINE CLINIC | Facility: CLINIC | Age: 49
End: 2024-07-12
Payer: COMMERCIAL

## 2024-07-12 VITALS
BODY MASS INDEX: 23.11 KG/M2 | HEART RATE: 79 BPM | HEIGHT: 69 IN | TEMPERATURE: 98.5 F | WEIGHT: 156 LBS | DIASTOLIC BLOOD PRESSURE: 70 MMHG | OXYGEN SATURATION: 99 % | SYSTOLIC BLOOD PRESSURE: 116 MMHG

## 2024-07-12 DIAGNOSIS — E78.1 HYPERTRIGLYCERIDEMIA: ICD-10-CM

## 2024-07-12 DIAGNOSIS — Z12.5 PROSTATE CANCER SCREENING: ICD-10-CM

## 2024-07-12 DIAGNOSIS — I10 HTN (HYPERTENSION), BENIGN: Primary | ICD-10-CM

## 2024-07-12 DIAGNOSIS — Z23 ENCOUNTER FOR IMMUNIZATION: ICD-10-CM

## 2024-07-12 DIAGNOSIS — E55.9 VITAMIN D DEFICIENCY: ICD-10-CM

## 2024-07-12 DIAGNOSIS — R73.03 PREDIABETES: ICD-10-CM

## 2024-07-12 PROCEDURE — 90750 HZV VACC RECOMBINANT IM: CPT

## 2024-07-12 PROCEDURE — 99213 OFFICE O/P EST LOW 20 MIN: CPT | Performed by: NURSE PRACTITIONER

## 2024-07-12 PROCEDURE — 90471 IMMUNIZATION ADMIN: CPT

## 2024-07-12 NOTE — PROGRESS NOTES
Ambulatory Visit  Name: Serg Fernando      : 1975      MRN: [id number]  Encounter Provider: ROCKY Bell  Encounter Date: 2024   Encounter department: [facility]    Assessment & Plan   1. HTN (hypertension), benign  -     Comprehensive metabolic panel; Future  -     CBC and differential; Future  2. Vitamin D deficiency  -     Vitamin D 25 hydroxy; Future  3. Prediabetes  -     Hemoglobin A1C; Future  4. Hypertriglyceridemia  -     Lipid panel; Future  5. Prostate cancer screening  -     PSA, Total Screen; Future  6. Encounter for immunization  -     Zoster Vaccine Recombinant IM         History of Present Illness     Here for 6 month [place]-  Reviewed recent lab results- HgA1c improved from 6.1 to 5.8, cholesterol is very good on Atorvastatin. All questions answered  BP today is good at 116/70 on Lisinopril  He reports he is doing well, would like to keep medications the same      Review of Systems   Constitutional:  Negative for activity change, diaphoresis, fatigue and fever.   HENT:  Negative for congestion, facial swelling, hearing loss, rhinorrhea, sinus pressure, sinus pain, sneezing, sore throat and voice change.    Eyes:  Negative for discharge and visual disturbance.   Respiratory:  Negative for cough, choking, chest tightness, shortness of breath, wheezing and stridor.    Cardiovascular:  Negative for chest pain, palpitations and leg swelling.   Gastrointestinal:  Negative for abdominal distention, abdominal pain, constipation, diarrhea, nausea and vomiting.   Endocrine: Negative for polydipsia, polyphagia and polyuria.   Genitourinary:  Negative for difficulty urinating, dysuria, frequency and urgency.   Musculoskeletal:  Negative for arthralgias, back pain, gait problem, joint swelling, myalgias, neck pain and neck stiffness.   Skin:  Negative for color change, rash and wound.   Neurological:  Negative for dizziness, syncope, speech difficulty,  "weakness, light-headedness and headaches.   Hematological:  Negative for adenopathy. Does not bruise/bleed easily.   Psychiatric/Behavioral:  Negative for agitation, behavioral problems, confusion, hallucinations, sleep disturbance and suicidal ideas. The patient is not nervous/anxious.      Past Medical History:   Diagnosis Date    Allergic     Hypertension      Past Surgical History:   Procedure Laterality Date    NOSE SURGERY      WISDOM TOOTH EXTRACTION       Family History   Problem Relation Age of Onset    Depression Mother     Depression Father     Hypertension Family      Social History     Tobacco Use    Smoking status: Never    Smokeless tobacco: Never   Vaping Use    Vaping status: Never Used   Substance and Sexual Activity    Alcohol use: Not Currently    Drug use: Never    Sexual activity: Not on file     Current Outpatient Medications on File Prior to Visit   Medication Sig    atorvastatin (LIPITOR) 10 mg tablet Take 1 tablet (10 mg total) by mouth daily    B-D ALLERGY SYRINGE 1CC/28G 28G X 1/2\" 1 ML MISC 3 SYRINGES PER WEEK FOR ALLERGY INJECTIONS    Cholecalciferol (Vitamin D) 125 MCG (5000 UT) CAPS Take 5,000 Units by mouth in the morning    ciclopirox (PENLAC) 8 % solution Apply topically daily at bedtime Apply daily to the affected toenails for 6 days, on the 7th day remove the medication from the toenail with nail polish remover or acetone and start the cycle again.    EPINEPHrine (EPIPEN) 0.3 mg/0.3 mL SOAJ Inject 0.3 mg into a muscle    fexofenadine (ALLEGRA) 180 MG tablet TAKE 1 TABLET (180 MG TOTAL) BY MOUTH DAILY.    hydrOXYzine HCL (ATARAX) 50 mg tablet TAKE 1 TABLET BY MOUTH 3 TIMES A DAY AS NEEDED FOR ANXIETY.    lisinopril (ZESTRIL) 20 mg tablet Take 1 tablet (20 mg total) by mouth 2 (two) times a day    montelukast (SINGULAIR) 10 mg tablet Take 1 tablet (10 mg total) by mouth daily at bedtime    SUMAtriptan (IMITREX) 100 mg tablet TAKE 1 TABLET (100 MG TOTAL) BY MOUTH ONCE AS NEEDED FOR " "MIGRAINE FOR UP TO 1 DOSE    Syringe/Needle, Disp, 28G X 1/2\" 1 ML MISC 3 syringes per week for allergy injections    [DISCONTINUED] benzonatate (TESSALON) 200 MG capsule Take 1 capsule (200 mg total) by mouth 3 (three) times a day as needed for cough (Patient not taking: Reported on 7/12/2024)     Allergies   Allergen Reactions    Pollen Extract      Immunization History   Administered Date(s) Administered    COVID-19 Moderna mRNA Vaccine 12 Yr+ 50 mcg/0.5 mL (Spikevax) 11/03/2023    COVID-19 PFIZER VACCINE 0.3 ML IM 01/25/2021, 02/15/2021, 11/28/2021    COVID-19 Pfizer Vac BIVALENT Greyson-sucrose 12 Yr+ IM 10/15/2022    H1N1, All Formulations 01/30/2010    INFLUENZA 10/01/2000, 10/13/2010, 09/29/2016, 09/21/2017, 10/09/2020, 11/01/2021, 10/20/2022, 09/12/2023    Influenza, injectable, quadrivalent, preservative free 0.5 mL 10/14/2019    Td (adult), Unspecified 01/01/2001    Tdap 03/21/2011, 11/20/2023    Zoster Vaccine Recombinant 07/12/2024     Objective     /70   Pulse 79   Temp 98.5 °F (36.9 °C)   Ht 5' 9\" (1.753 m)   Wt 70.8 kg (156 lb)   SpO2 99%   BMI 23.04 kg/m²     Physical Exam  Vitals and nursing note reviewed.   Constitutional:       General: He is not in acute distress.     Appearance: Normal appearance. He is well-developed. He is not diaphoretic.   HENT:      Right Ear: Tympanic membrane, ear canal and external ear normal.      Left Ear: Tympanic membrane, ear canal and external ear normal.   Eyes:      General:         Right eye: No discharge.         Left eye: No discharge.      Conjunctiva/sclera: Conjunctivae normal.   Neck:      Thyroid: No thyromegaly.   Cardiovascular:      Rate and Rhythm: Normal rate and regular rhythm.      Heart sounds: Normal heart sounds. No murmur heard.  Pulmonary:      Effort: Pulmonary effort is normal. No respiratory distress.      Breath sounds: Normal breath sounds. No wheezing.   Musculoskeletal:         General: No tenderness or deformity. Normal range " of motion.      Cervical back: Normal range of motion and neck supple.      Right lower leg: No edema.      Left lower leg: No edema.   Skin:     General: Skin is warm and dry.   Neurological:      Mental Status: He is alert and oriented to person, place, and time.   Psychiatric:         Mood and Affect: Mood normal.         Speech: Speech normal.         Behavior: Behavior normal.         Thought Content: Thought content normal.         Judgment: Judgment normal.

## 2024-08-26 DIAGNOSIS — I10 HTN (HYPERTENSION), BENIGN: ICD-10-CM

## 2024-08-26 DIAGNOSIS — Z51.6 DESENSITIZATION TO ALLERGY SHOT: Primary | ICD-10-CM

## 2024-08-27 RX ORDER — LISINOPRIL 20 MG/1
20 TABLET ORAL 2 TIMES DAILY
Qty: 180 TABLET | Refills: 1 | Status: SHIPPED | OUTPATIENT
Start: 2024-08-27

## 2024-08-29 NOTE — TELEPHONE ENCOUNTER
I need to know how many he gets- the sig isn't filled in. I needed to know specifics so I can sign this

## 2024-11-15 ENCOUNTER — APPOINTMENT (OUTPATIENT)
Dept: LAB | Facility: CLINIC | Age: 49
End: 2024-11-15
Payer: COMMERCIAL

## 2024-11-15 DIAGNOSIS — I10 HTN (HYPERTENSION), BENIGN: ICD-10-CM

## 2024-11-15 DIAGNOSIS — Z12.5 PROSTATE CANCER SCREENING: ICD-10-CM

## 2024-11-15 DIAGNOSIS — E55.9 VITAMIN D DEFICIENCY: ICD-10-CM

## 2024-11-15 DIAGNOSIS — E78.1 HYPERTRIGLYCERIDEMIA: ICD-10-CM

## 2024-11-15 DIAGNOSIS — R73.03 PREDIABETES: ICD-10-CM

## 2024-11-15 LAB
25(OH)D3 SERPL-MCNC: 54.9 NG/ML (ref 30–100)
ALBUMIN SERPL BCG-MCNC: 4.2 G/DL (ref 3.5–5)
ALP SERPL-CCNC: 45 U/L (ref 34–104)
ALT SERPL W P-5'-P-CCNC: 58 U/L (ref 7–52)
ANION GAP SERPL CALCULATED.3IONS-SCNC: 6 MMOL/L (ref 4–13)
AST SERPL W P-5'-P-CCNC: 31 U/L (ref 13–39)
BASOPHILS # BLD AUTO: 0.01 THOUSANDS/ÂΜL (ref 0–0.1)
BASOPHILS NFR BLD AUTO: 0 % (ref 0–1)
BILIRUB SERPL-MCNC: 0.52 MG/DL (ref 0.2–1)
BUN SERPL-MCNC: 17 MG/DL (ref 5–25)
CALCIUM SERPL-MCNC: 9.3 MG/DL (ref 8.4–10.2)
CHLORIDE SERPL-SCNC: 101 MMOL/L (ref 96–108)
CHOLEST SERPL-MCNC: 166 MG/DL (ref ?–200)
CO2 SERPL-SCNC: 30 MMOL/L (ref 21–32)
CREAT SERPL-MCNC: 1.07 MG/DL (ref 0.6–1.3)
EOSINOPHIL # BLD AUTO: 0.12 THOUSAND/ÂΜL (ref 0–0.61)
EOSINOPHIL NFR BLD AUTO: 3 % (ref 0–6)
ERYTHROCYTE [DISTWIDTH] IN BLOOD BY AUTOMATED COUNT: 12.1 % (ref 11.6–15.1)
EST. AVERAGE GLUCOSE BLD GHB EST-MCNC: 117 MG/DL
GFR SERPL CREATININE-BSD FRML MDRD: 80 ML/MIN/1.73SQ M
GLUCOSE P FAST SERPL-MCNC: 99 MG/DL (ref 65–99)
HBA1C MFR BLD: 5.7 %
HCT VFR BLD AUTO: 42.8 % (ref 36.5–49.3)
HDLC SERPL-MCNC: 42 MG/DL
HGB BLD-MCNC: 14.3 G/DL (ref 12–17)
IMM GRANULOCYTES # BLD AUTO: 0.01 THOUSAND/UL (ref 0–0.2)
IMM GRANULOCYTES NFR BLD AUTO: 0 % (ref 0–2)
LDLC SERPL CALC-MCNC: 98 MG/DL (ref 0–100)
LYMPHOCYTES # BLD AUTO: 1.14 THOUSANDS/ÂΜL (ref 0.6–4.47)
LYMPHOCYTES NFR BLD AUTO: 24 % (ref 14–44)
MCH RBC QN AUTO: 29.2 PG (ref 26.8–34.3)
MCHC RBC AUTO-ENTMCNC: 33.4 G/DL (ref 31.4–37.4)
MCV RBC AUTO: 88 FL (ref 82–98)
MONOCYTES # BLD AUTO: 0.44 THOUSAND/ÂΜL (ref 0.17–1.22)
MONOCYTES NFR BLD AUTO: 9 % (ref 4–12)
NEUTROPHILS # BLD AUTO: 2.97 THOUSANDS/ÂΜL (ref 1.85–7.62)
NEUTS SEG NFR BLD AUTO: 64 % (ref 43–75)
NONHDLC SERPL-MCNC: 124 MG/DL
NRBC BLD AUTO-RTO: 0 /100 WBCS
PLATELET # BLD AUTO: 207 THOUSANDS/UL (ref 149–390)
PMV BLD AUTO: 10.7 FL (ref 8.9–12.7)
POTASSIUM SERPL-SCNC: 4.7 MMOL/L (ref 3.5–5.3)
PROT SERPL-MCNC: 7 G/DL (ref 6.4–8.4)
PSA SERPL-MCNC: 0.68 NG/ML (ref 0–4)
RBC # BLD AUTO: 4.89 MILLION/UL (ref 3.88–5.62)
SODIUM SERPL-SCNC: 137 MMOL/L (ref 135–147)
TRIGL SERPL-MCNC: 129 MG/DL (ref ?–150)
WBC # BLD AUTO: 4.69 THOUSAND/UL (ref 4.31–10.16)

## 2024-11-15 PROCEDURE — 80053 COMPREHEN METABOLIC PANEL: CPT

## 2024-11-15 PROCEDURE — 85025 COMPLETE CBC W/AUTO DIFF WBC: CPT

## 2024-11-15 PROCEDURE — 83036 HEMOGLOBIN GLYCOSYLATED A1C: CPT

## 2024-11-15 PROCEDURE — G0103 PSA SCREENING: HCPCS

## 2024-11-15 PROCEDURE — 82306 VITAMIN D 25 HYDROXY: CPT

## 2024-11-15 PROCEDURE — 80061 LIPID PANEL: CPT

## 2024-11-15 PROCEDURE — 36415 COLL VENOUS BLD VENIPUNCTURE: CPT

## 2024-12-03 ENCOUNTER — OFFICE VISIT (OUTPATIENT)
Dept: FAMILY MEDICINE CLINIC | Facility: CLINIC | Age: 49
End: 2024-12-03
Payer: COMMERCIAL

## 2024-12-03 VITALS
TEMPERATURE: 98 F | OXYGEN SATURATION: 99 % | RESPIRATION RATE: 18 BRPM | BODY MASS INDEX: 24.73 KG/M2 | WEIGHT: 167 LBS | HEIGHT: 69 IN | DIASTOLIC BLOOD PRESSURE: 62 MMHG | HEART RATE: 89 BPM | SYSTOLIC BLOOD PRESSURE: 118 MMHG

## 2024-12-03 DIAGNOSIS — R73.03 PREDIABETES: ICD-10-CM

## 2024-12-03 DIAGNOSIS — Z23 ENCOUNTER FOR IMMUNIZATION: ICD-10-CM

## 2024-12-03 DIAGNOSIS — E55.9 VITAMIN D DEFICIENCY: ICD-10-CM

## 2024-12-03 DIAGNOSIS — I10 HTN (HYPERTENSION), BENIGN: ICD-10-CM

## 2024-12-03 DIAGNOSIS — J30.1 SEASONAL ALLERGIC RHINITIS DUE TO POLLEN: ICD-10-CM

## 2024-12-03 DIAGNOSIS — E78.1 HYPERTRIGLYCERIDEMIA: ICD-10-CM

## 2024-12-03 DIAGNOSIS — Z00.00 ANNUAL PHYSICAL EXAM: Primary | ICD-10-CM

## 2024-12-03 PROCEDURE — 99396 PREV VISIT EST AGE 40-64: CPT | Performed by: NURSE PRACTITIONER

## 2024-12-03 PROCEDURE — 90471 IMMUNIZATION ADMIN: CPT

## 2024-12-03 PROCEDURE — 90750 HZV VACC RECOMBINANT IM: CPT

## 2024-12-03 PROCEDURE — 99213 OFFICE O/P EST LOW 20 MIN: CPT | Performed by: NURSE PRACTITIONER

## 2024-12-03 NOTE — PROGRESS NOTES
Adult Annual Physical  Name: Serg Fernando      : 1975      MRN: [id number]  Encounter Provider: ROCKY Bell  Encounter Date: 12/3/2024   Encounter department: [facility]    Assessment & Plan  Encounter for immunization    Orders:    Zoster Vaccine Recombinant IM    Vitamin D deficiency    Orders:    Vitamin D 25 hydroxy; Future    Prediabetes    Orders:    Hemoglobin A1C; Future    Hypertriglyceridemia    Orders:    Lipid panel; Future    Annual physical exam    Orders:    Comprehensive metabolic panel; Future    CBC and differential; Future    HTN (hypertension), benign         Seasonal allergic rhinitis due to pollen         Immunizations and preventive care screenings were discussed with patient today. Appropriate education was printed on patient's after visit summary.          Depression Screening and Follow-up Plan: Patient was screened for depression during today's encounter. They screened negative with a PHQ-2 score of 0.        History of Present Illness     Adult Annual Physical:  Patient presents for annual physical. Here for annual physical and lab review- Hga1c is improved to 5.7, all questions answered  Is taking medications as directed except Hydroxyzine only when needed for anxiety at bedtime- it does make him sleepy. We discussed that he could cut in 1/2 if he didn't want to feel as sleepy.     Depression Screening:  - PHQ-2 Score: 0    Review of Systems   Constitutional:  Negative for activity change, diaphoresis, fatigue and fever.   HENT:  Negative for congestion, facial swelling, hearing loss, rhinorrhea, sinus pressure, sinus pain, sneezing, sore throat and voice change.    Eyes:  Negative for discharge and visual disturbance.   Respiratory:  Negative for cough, choking, chest tightness, shortness of breath, wheezing and stridor.    Cardiovascular:  Negative for chest pain, palpitations and leg swelling.   Gastrointestinal:  Negative for abdominal  "distention, abdominal pain, constipation, diarrhea, nausea and vomiting.   Endocrine: Negative for polydipsia, polyphagia and polyuria.   Genitourinary:  Negative for difficulty urinating, dysuria, frequency and urgency.   Musculoskeletal:  Negative for arthralgias, back pain, gait problem, joint swelling, myalgias, neck pain and neck stiffness.   Skin:  Negative for color change, rash and wound.   Neurological:  Negative for dizziness, syncope, speech difficulty, weakness, light-headedness and headaches.   Hematological:  Negative for adenopathy. Does not bruise/bleed easily.   Psychiatric/Behavioral:  Negative for agitation, behavioral problems, confusion, hallucinations, sleep disturbance and suicidal ideas. The patient is not nervous/anxious.      Medical History Reviewed by provider this encounter:     .    Objective   /62   Pulse 89   Temp 98 °F (36.7 °C)   Resp 18   Ht 5' 9\" (1.753 m)   Wt 75.8 kg (167 lb)   SpO2 99%   BMI 24.66 kg/m²     Physical Exam  Vitals and nursing note reviewed.   Constitutional:       General: He is not in acute distress.     Appearance: He is well-developed. He is not diaphoretic.   HENT:      Head: Normocephalic and atraumatic.      Right Ear: Tympanic membrane, ear canal and external ear normal.      Left Ear: Tympanic membrane, ear canal and external ear normal.      Nose: Nose normal.      Mouth/Throat:      Pharynx: Uvula midline. Oropharyngeal exudate (clear) present.   Eyes:      General:         Right eye: No discharge.         Left eye: No discharge.      Conjunctiva/sclera: Conjunctivae normal.      Pupils: Pupils are equal, round, and reactive to light.   Neck:      Thyroid: No thyromegaly.      Trachea: No tracheal deviation.   Cardiovascular:      Rate and Rhythm: Normal rate and regular rhythm.      Heart sounds: Normal heart sounds. No murmur heard.  Pulmonary:      Effort: Pulmonary effort is normal. No respiratory distress.      Breath sounds: Normal " breath sounds. No wheezing.   Musculoskeletal:         General: Normal range of motion.      Cervical back: Normal range of motion and neck supple.   Lymphadenopathy:      Cervical: No cervical adenopathy.   Skin:     General: Skin is warm and dry.   Neurological:      Mental Status: He is alert and oriented to person, place, and time.   Psychiatric:         Mood and Affect: Mood normal.         Behavior: Behavior normal.         Thought Content: Thought content normal.         Judgment: Judgment normal.

## 2024-12-20 DIAGNOSIS — E78.1 HYPERTRIGLYCERIDEMIA: ICD-10-CM

## 2024-12-20 RX ORDER — ATORVASTATIN CALCIUM 10 MG/1
10 TABLET, FILM COATED ORAL DAILY
Qty: 90 TABLET | Refills: 3 | Status: SHIPPED | OUTPATIENT
Start: 2024-12-20

## 2024-12-22 DIAGNOSIS — Z76.0 MEDICATION REFILL: ICD-10-CM

## 2024-12-23 RX ORDER — MONTELUKAST SODIUM 10 MG/1
10 TABLET ORAL
Qty: 90 TABLET | Refills: 1 | Status: SHIPPED | OUTPATIENT
Start: 2024-12-23

## 2025-02-20 DIAGNOSIS — K58.0 IRRITABLE BOWEL SYNDROME WITH DIARRHEA: ICD-10-CM

## 2025-02-28 DIAGNOSIS — I10 HTN (HYPERTENSION), BENIGN: ICD-10-CM

## 2025-03-02 DIAGNOSIS — K58.0 IRRITABLE BOWEL SYNDROME WITH DIARRHEA: ICD-10-CM

## 2025-03-03 RX ORDER — LISINOPRIL 20 MG/1
20 TABLET ORAL 2 TIMES DAILY
Qty: 180 TABLET | Refills: 3 | Status: SHIPPED | OUTPATIENT
Start: 2025-03-03

## 2025-03-03 NOTE — TELEPHONE ENCOUNTER
Patient requesting refill(s) of: lisinopril 20 mg BID    Last filled: 8/27/24 #180 x 1  Last appt: 12/3/24  Next appt: 6/2/25  Pharmacy: [facility]

## 2025-03-27 DIAGNOSIS — E78.1 HYPERTRIGLYCERIDEMIA: Primary | ICD-10-CM

## 2025-03-27 RX ORDER — ROSUVASTATIN CALCIUM 5 MG/1
5 TABLET, COATED ORAL DAILY
Qty: 100 TABLET | Refills: 3 | Status: SHIPPED | OUTPATIENT
Start: 2025-03-27

## 2025-05-16 ENCOUNTER — APPOINTMENT (OUTPATIENT)
Dept: LAB | Facility: CLINIC | Age: 50
End: 2025-05-16
Payer: COMMERCIAL

## 2025-05-16 DIAGNOSIS — E78.1 HYPERTRIGLYCERIDEMIA: ICD-10-CM

## 2025-05-16 DIAGNOSIS — R73.03 PREDIABETES: ICD-10-CM

## 2025-05-16 DIAGNOSIS — Z00.00 ANNUAL PHYSICAL EXAM: ICD-10-CM

## 2025-05-16 DIAGNOSIS — E55.9 VITAMIN D DEFICIENCY: ICD-10-CM

## 2025-05-16 LAB
25(OH)D3 SERPL-MCNC: 64.1 NG/ML (ref 30–100)
ALBUMIN SERPL BCG-MCNC: 4.3 G/DL (ref 3.5–5)
ALP SERPL-CCNC: 44 U/L (ref 34–104)
ALT SERPL W P-5'-P-CCNC: 25 U/L (ref 7–52)
ANION GAP SERPL CALCULATED.3IONS-SCNC: 8 MMOL/L (ref 4–13)
AST SERPL W P-5'-P-CCNC: 23 U/L (ref 13–39)
BASOPHILS # BLD AUTO: 0.01 THOUSANDS/ÂΜL (ref 0–0.1)
BASOPHILS NFR BLD AUTO: 0 % (ref 0–1)
BILIRUB SERPL-MCNC: 0.68 MG/DL (ref 0.2–1)
BUN SERPL-MCNC: 24 MG/DL (ref 5–25)
CALCIUM SERPL-MCNC: 9.1 MG/DL (ref 8.4–10.2)
CHLORIDE SERPL-SCNC: 103 MMOL/L (ref 96–108)
CHOLEST SERPL-MCNC: 117 MG/DL (ref ?–200)
CO2 SERPL-SCNC: 28 MMOL/L (ref 21–32)
CREAT SERPL-MCNC: 1.13 MG/DL (ref 0.6–1.3)
EOSINOPHIL # BLD AUTO: 0.07 THOUSAND/ÂΜL (ref 0–0.61)
EOSINOPHIL NFR BLD AUTO: 2 % (ref 0–6)
ERYTHROCYTE [DISTWIDTH] IN BLOOD BY AUTOMATED COUNT: 11.9 % (ref 11.6–15.1)
EST. AVERAGE GLUCOSE BLD GHB EST-MCNC: 126 MG/DL
GFR SERPL CREATININE-BSD FRML MDRD: 74 ML/MIN/1.73SQ M
GLUCOSE P FAST SERPL-MCNC: 102 MG/DL (ref 65–99)
HBA1C MFR BLD: 6 %
HCT VFR BLD AUTO: 41.5 % (ref 36.5–49.3)
HDLC SERPL-MCNC: 44 MG/DL
HGB BLD-MCNC: 13.8 G/DL (ref 12–17)
IMM GRANULOCYTES # BLD AUTO: 0.01 THOUSAND/UL (ref 0–0.2)
IMM GRANULOCYTES NFR BLD AUTO: 0 % (ref 0–2)
LDLC SERPL CALC-MCNC: 56 MG/DL (ref 0–100)
LYMPHOCYTES # BLD AUTO: 1.12 THOUSANDS/ÂΜL (ref 0.6–4.47)
LYMPHOCYTES NFR BLD AUTO: 24 % (ref 14–44)
MCH RBC QN AUTO: 28.9 PG (ref 26.8–34.3)
MCHC RBC AUTO-ENTMCNC: 33.3 G/DL (ref 31.4–37.4)
MCV RBC AUTO: 87 FL (ref 82–98)
MONOCYTES # BLD AUTO: 0.4 THOUSAND/ÂΜL (ref 0.17–1.22)
MONOCYTES NFR BLD AUTO: 9 % (ref 4–12)
NEUTROPHILS # BLD AUTO: 3.01 THOUSANDS/ÂΜL (ref 1.85–7.62)
NEUTS SEG NFR BLD AUTO: 65 % (ref 43–75)
NONHDLC SERPL-MCNC: 73 MG/DL
NRBC BLD AUTO-RTO: 0 /100 WBCS
PLATELET # BLD AUTO: 183 THOUSANDS/UL (ref 149–390)
PMV BLD AUTO: 11.3 FL (ref 8.9–12.7)
POTASSIUM SERPL-SCNC: 4.3 MMOL/L (ref 3.5–5.3)
PROT SERPL-MCNC: 7 G/DL (ref 6.4–8.4)
RBC # BLD AUTO: 4.78 MILLION/UL (ref 3.88–5.62)
SODIUM SERPL-SCNC: 139 MMOL/L (ref 135–147)
TRIGL SERPL-MCNC: 83 MG/DL (ref ?–150)
WBC # BLD AUTO: 4.62 THOUSAND/UL (ref 4.31–10.16)

## 2025-05-16 PROCEDURE — 36415 COLL VENOUS BLD VENIPUNCTURE: CPT

## 2025-05-16 PROCEDURE — 85025 COMPLETE CBC W/AUTO DIFF WBC: CPT

## 2025-05-16 PROCEDURE — 80061 LIPID PANEL: CPT

## 2025-05-16 PROCEDURE — 83036 HEMOGLOBIN GLYCOSYLATED A1C: CPT

## 2025-05-16 PROCEDURE — 80053 COMPREHEN METABOLIC PANEL: CPT

## 2025-05-16 PROCEDURE — 82306 VITAMIN D 25 HYDROXY: CPT

## 2025-06-02 ENCOUNTER — OFFICE VISIT (OUTPATIENT)
Dept: FAMILY MEDICINE CLINIC | Facility: CLINIC | Age: 50
End: 2025-06-02
Payer: COMMERCIAL

## 2025-06-02 VITALS
DIASTOLIC BLOOD PRESSURE: 68 MMHG | OXYGEN SATURATION: 98 % | RESPIRATION RATE: 18 BRPM | BODY MASS INDEX: 24.14 KG/M2 | TEMPERATURE: 97.4 F | HEIGHT: 69 IN | WEIGHT: 163 LBS | SYSTOLIC BLOOD PRESSURE: 118 MMHG | HEART RATE: 88 BPM

## 2025-06-02 DIAGNOSIS — Z12.5 PROSTATE CANCER SCREENING: ICD-10-CM

## 2025-06-02 DIAGNOSIS — I10 HTN (HYPERTENSION), BENIGN: ICD-10-CM

## 2025-06-02 DIAGNOSIS — R73.03 PREDIABETES: ICD-10-CM

## 2025-06-02 DIAGNOSIS — E55.9 VITAMIN D DEFICIENCY: ICD-10-CM

## 2025-06-02 DIAGNOSIS — E78.1 HYPERTRIGLYCERIDEMIA: Primary | ICD-10-CM

## 2025-06-02 PROCEDURE — 99213 OFFICE O/P EST LOW 20 MIN: CPT | Performed by: NURSE PRACTITIONER

## 2025-06-02 NOTE — PROGRESS NOTES
Name: Serg Fernando      : 1975      MRN: [id number]  Encounter Provider: ROCKY Bell  Encounter Date: 2025   Encounter department: [facility]  :  Assessment & Plan  Hypertriglyceridemia    Orders:    Lipid panel; Future    Vitamin D deficiency    Orders:    Vitamin D 25 hydroxy; Future    Prediabetes    Orders:    Hemoglobin A1C; Future    Prostate cancer screening    Orders:    PSA, Total Screen; Future    HTN (hypertension), benign    Orders:    Comprehensive metabolic panel; Future    CBC and differential; Future          Depression Screening and Follow-up Plan: Patient was screened for depression during today's encounter. They screened negative with a PHQ-2 score of 0.        History of Present Illness   Here for 6 month medcheck and lab review- all questions answered  Remains in prediabetic range at 6.0, he will continue to avoid sugar/carbohydrates  Will continue same medications- reviewed with him, is taking as directed      Review of Systems   Constitutional:  Negative for activity change, diaphoresis, fatigue and fever.   HENT:  Negative for congestion, facial swelling, hearing loss, rhinorrhea, sinus pressure, sinus pain, sneezing, sore throat and voice change.    Eyes:  Negative for discharge and visual disturbance.   Respiratory:  Negative for cough, choking, chest tightness, shortness of breath, wheezing and stridor.    Cardiovascular:  Negative for chest pain, palpitations and leg swelling.   Gastrointestinal:  Negative for abdominal distention, abdominal pain, constipation, diarrhea, nausea and vomiting.   Endocrine: Negative for polydipsia, polyphagia and polyuria.   Genitourinary:  Negative for difficulty urinating, dysuria, frequency and urgency.   Musculoskeletal:  Negative for arthralgias, back pain, gait problem, joint swelling, myalgias, neck pain and neck stiffness.   Skin:  Negative for color change, rash and wound.   Neurological:   "Negative for dizziness, syncope, speech difficulty, weakness, light-headedness and headaches.   Hematological:  Negative for adenopathy. Does not bruise/bleed easily.   Psychiatric/Behavioral:  Negative for agitation, behavioral problems, confusion, hallucinations, sleep disturbance and suicidal ideas. The patient is not nervous/anxious.        Objective   /68   Pulse 88   Temp (!) 97.4 °F (36.3 °C)   Resp 18   Ht 5' 9\" (1.753 m)   Wt 73.9 kg (163 lb)   SpO2 98%   BMI 24.07 kg/m²      Physical Exam  Vitals and nursing note reviewed.   Constitutional:       General: He is not in acute distress.     Appearance: Normal appearance.   Neck:      Vascular: No carotid bruit.     Cardiovascular:      Rate and Rhythm: Normal rate and regular rhythm.      Heart sounds: Normal heart sounds. No murmur heard.  Pulmonary:      Effort: Pulmonary effort is normal. No respiratory distress.      Breath sounds: Normal breath sounds. No wheezing.     Musculoskeletal:      Cervical back: Neck supple.     Neurological:      Mental Status: He is alert and oriented to person, place, and time.     Psychiatric:         Mood and Affect: Mood normal.         Behavior: Behavior normal.         Thought Content: Thought content normal.         Judgment: Judgment normal.         "

## 2025-06-18 ENCOUNTER — VBI (OUTPATIENT)
Dept: ADMINISTRATIVE | Facility: OTHER | Age: 50
End: 2025-06-18

## 2025-06-18 DIAGNOSIS — Z51.6 DESENSITIZATION TO ALLERGY SHOT: ICD-10-CM

## 2025-06-18 NOTE — TELEPHONE ENCOUNTER
06/18/25 11:42 AM     Chart reviewed for [facility]: Colonoscopy ; nothing is submitted to the patient's insurance at this time.     Sebas Feldman MA   [facility]

## 2025-06-24 ENCOUNTER — TELEPHONE (OUTPATIENT)
Age: 50
End: 2025-06-24

## 2025-06-24 NOTE — TELEPHONE ENCOUNTER
"Jose Francisco from [facility] called and stated that they received a prescription on 6/19/25 that needs clarification.   Syringe/Needle, Disp, 28G X 1/2\" 1 ML MISC   Per their pharmacist the above is an insulin syringe and the patient doesn't have an insulin order on file. They want to make sure its the correct order.  They would also want clarification on the instructions. Please review and call [facility] back within 24 hrs at [phone] and ref# [id number]  "

## 2025-06-25 DIAGNOSIS — Z76.0 MEDICATION REFILL: ICD-10-CM

## 2025-06-26 RX ORDER — MONTELUKAST SODIUM 10 MG/1
10 TABLET ORAL
Qty: 90 TABLET | Refills: 1 | Status: SHIPPED | OUTPATIENT
Start: 2025-06-26

## 2025-07-17 DIAGNOSIS — Z51.6 DESENSITIZATION TO ALLERGY SHOT: ICD-10-CM

## 2025-07-26 DIAGNOSIS — Z51.6 DESENSITIZATION TO ALLERGY SHOT: ICD-10-CM

## 2025-07-29 DIAGNOSIS — Z51.6 DESENSITIZATION TO ALLERGY SHOT: ICD-10-CM

## 2025-07-30 DIAGNOSIS — Z51.6 DESENSITIZATION TO ALLERGY SHOT: Primary | ICD-10-CM

## 2025-07-30 DIAGNOSIS — Z51.6 DESENSITIZATION TO ALLERGY SHOT: ICD-10-CM

## 2025-07-30 RX ORDER — SYRINGE WITH NEEDLE, 1 ML 28GX1/2"
SYRINGE, EMPTY DISPOSABLE MISCELLANEOUS AS NEEDED
Qty: 25 EACH | Refills: 2 | Status: SHIPPED | OUTPATIENT
Start: 2025-07-30

## 2025-07-31 RX ORDER — NEEDLES, SAFETY 22GX1 1/2"
NEEDLE, DISPOSABLE MISCELLANEOUS
Qty: 100 EACH | Refills: 1 | Status: SHIPPED | OUTPATIENT
Start: 2025-07-31

## 2025-07-31 RX ORDER — SYRINGE AND NEEDLE,INSULIN,1ML 28GX1/2"
SYRINGE, EMPTY DISPOSABLE MISCELLANEOUS
Qty: 100 EACH | Refills: 0 | OUTPATIENT
Start: 2025-07-31